# Patient Record
Sex: MALE | Race: BLACK OR AFRICAN AMERICAN | NOT HISPANIC OR LATINO | Employment: UNEMPLOYED | URBAN - METROPOLITAN AREA
[De-identification: names, ages, dates, MRNs, and addresses within clinical notes are randomized per-mention and may not be internally consistent; named-entity substitution may affect disease eponyms.]

---

## 2018-11-26 ENCOUNTER — HOSPITAL ENCOUNTER (INPATIENT)
Facility: HOSPITAL | Age: 53
LOS: 4 days | Discharge: HOME | DRG: 304 | End: 2018-11-30
Attending: EMERGENCY MEDICINE | Admitting: FAMILY MEDICINE
Payer: MEDICAID

## 2018-11-26 DIAGNOSIS — R06.01 ORTHOPNEA: ICD-10-CM

## 2018-11-26 DIAGNOSIS — I50.9 ACUTE CONGESTIVE HEART FAILURE, UNSPECIFIED HEART FAILURE TYPE: ICD-10-CM

## 2018-11-26 DIAGNOSIS — N17.9 ACUTE KIDNEY INJURY: Primary | ICD-10-CM

## 2018-11-26 DIAGNOSIS — R79.89 ELEVATED TROPONIN: ICD-10-CM

## 2018-11-26 DIAGNOSIS — I16.1 HYPERTENSIVE EMERGENCY: ICD-10-CM

## 2018-11-26 DIAGNOSIS — R06.02 SOB (SHORTNESS OF BREATH): ICD-10-CM

## 2018-11-26 LAB
ALBUMIN SERPL BCP-MCNC: 3.4 G/DL
ALP SERPL-CCNC: 75 U/L
ALT SERPL W/O P-5'-P-CCNC: 13 U/L
AMPHET+METHAMPHET UR QL: NEGATIVE
ANION GAP SERPL CALC-SCNC: 11 MMOL/L
ANION GAP SERPL CALC-SCNC: 8 MMOL/L
APTT BLDCRRT: 28 SEC
AST SERPL-CCNC: 18 U/L
BACTERIA #/AREA URNS HPF: NORMAL /HPF
BARBITURATES UR QL SCN>200 NG/ML: NEGATIVE
BASOPHILS # BLD AUTO: 0.05 K/UL
BASOPHILS NFR BLD: 0.6 %
BENZODIAZ UR QL SCN>200 NG/ML: NEGATIVE
BILIRUB SERPL-MCNC: 0.7 MG/DL
BILIRUB UR QL STRIP: NEGATIVE
BNP SERPL-MCNC: 1083 PG/ML
BNP SERPL-MCNC: 1083 PG/ML
BUN SERPL-MCNC: 46 MG/DL
BUN SERPL-MCNC: 50 MG/DL
BZE UR QL SCN: NEGATIVE
CALCIUM SERPL-MCNC: 9.1 MG/DL
CALCIUM SERPL-MCNC: 9.4 MG/DL
CANNABINOIDS UR QL SCN: NORMAL
CHLORIDE SERPL-SCNC: 103 MMOL/L
CHLORIDE SERPL-SCNC: 104 MMOL/L
CK SERPL-CCNC: 119 U/L
CLARITY UR: CLEAR
CO2 SERPL-SCNC: 24 MMOL/L
CO2 SERPL-SCNC: 28 MMOL/L
COLOR UR: YELLOW
CREAT SERPL-MCNC: 5.1 MG/DL
CREAT SERPL-MCNC: 5.1 MG/DL
CREAT UR-MCNC: 45.7 MG/DL
CREAT UR-MCNC: 45.7 MG/DL
CREAT UR-MCNC: 74 MG/DL
DIFFERENTIAL METHOD: ABNORMAL
EOSINOPHIL # BLD AUTO: 0.2 K/UL
EOSINOPHIL NFR BLD: 2.5 %
ERYTHROCYTE [DISTWIDTH] IN BLOOD BY AUTOMATED COUNT: 14.2 %
EST. GFR  (AFRICAN AMERICAN): 14 ML/MIN/1.73 M^2
EST. GFR  (AFRICAN AMERICAN): 14 ML/MIN/1.73 M^2
EST. GFR  (NON AFRICAN AMERICAN): 12 ML/MIN/1.73 M^2
EST. GFR  (NON AFRICAN AMERICAN): 12 ML/MIN/1.73 M^2
ESTIMATED AVG GLUCOSE: 100 MG/DL
ETHANOL UR-MCNC: <10 MG/DL
GLUCOSE SERPL-MCNC: 110 MG/DL
GLUCOSE SERPL-MCNC: 91 MG/DL
GLUCOSE UR QL STRIP: NEGATIVE
HBA1C MFR BLD HPLC: 5.1 %
HCT VFR BLD AUTO: 38 %
HGB BLD-MCNC: 12.8 G/DL
HGB UR QL STRIP: ABNORMAL
HYALINE CASTS #/AREA URNS LPF: 0 /LPF
INR PPP: 1
KETONES UR QL STRIP: NEGATIVE
LEUKOCYTE ESTERASE UR QL STRIP: NEGATIVE
LYMPHOCYTES # BLD AUTO: 1.8 K/UL
LYMPHOCYTES NFR BLD: 22.3 %
MCH RBC QN AUTO: 27.9 PG
MCHC RBC AUTO-ENTMCNC: 33.7 G/DL
MCV RBC AUTO: 83 FL
METHADONE UR QL SCN>300 NG/ML: NEGATIVE
MICROSCOPIC COMMENT: NORMAL
MONOCYTES # BLD AUTO: 0.5 K/UL
MONOCYTES NFR BLD: 5.8 %
NEUTROPHILS # BLD AUTO: 5.6 K/UL
NEUTROPHILS NFR BLD: 68.7 %
NITRITE UR QL STRIP: NEGATIVE
OPIATES UR QL SCN: NEGATIVE
PCP UR QL SCN>25 NG/ML: NEGATIVE
PH UR STRIP: 7 [PH] (ref 5–8)
PLATELET # BLD AUTO: 191 K/UL
PMV BLD AUTO: 10.2 FL
POCT GLUCOSE: 92 MG/DL (ref 70–110)
POTASSIUM SERPL-SCNC: 3.8 MMOL/L
POTASSIUM SERPL-SCNC: 4.3 MMOL/L
PROT SERPL-MCNC: 6.9 G/DL
PROT UR QL STRIP: ABNORMAL
PROT UR-MCNC: 43 MG/DL
PROT/CREAT UR: 0.94 MG/G{CREAT}
PROTHROMBIN TIME: 10.1 SEC
RBC # BLD AUTO: 4.59 M/UL
RBC #/AREA URNS HPF: 1 /HPF (ref 0–4)
SODIUM SERPL-SCNC: 139 MMOL/L
SODIUM SERPL-SCNC: 139 MMOL/L
SODIUM UR-SCNC: 94 MMOL/L
SP GR UR STRIP: 1.01 (ref 1–1.03)
TOXICOLOGY INFORMATION: NORMAL
TROPONIN I SERPL DL<=0.01 NG/ML-MCNC: 0.32 NG/ML
TROPONIN I SERPL DL<=0.01 NG/ML-MCNC: 0.34 NG/ML
TROPONIN I SERPL DL<=0.01 NG/ML-MCNC: 0.37 NG/ML
TSH SERPL DL<=0.005 MIU/L-ACNC: 1.43 UIU/ML
URN SPEC COLLECT METH UR: ABNORMAL
UROBILINOGEN UR STRIP-ACNC: NEGATIVE EU/DL
UUN UR-MCNC: 295 MG/DL
WBC # BLD AUTO: 8.11 K/UL
WBC #/AREA URNS HPF: 1 /HPF (ref 0–5)
YEAST URNS QL MICRO: NORMAL

## 2018-11-26 PROCEDURE — 25000003 PHARM REV CODE 250: Performed by: EMERGENCY MEDICINE

## 2018-11-26 PROCEDURE — 84484 ASSAY OF TROPONIN QUANT: CPT | Mod: 91

## 2018-11-26 PROCEDURE — 81000 URINALYSIS NONAUTO W/SCOPE: CPT

## 2018-11-26 PROCEDURE — 85730 THROMBOPLASTIN TIME PARTIAL: CPT

## 2018-11-26 PROCEDURE — 85610 PROTHROMBIN TIME: CPT

## 2018-11-26 PROCEDURE — 25000003 PHARM REV CODE 250: Performed by: STUDENT IN AN ORGANIZED HEALTH CARE EDUCATION/TRAINING PROGRAM

## 2018-11-26 PROCEDURE — 96374 THER/PROPH/DIAG INJ IV PUSH: CPT

## 2018-11-26 PROCEDURE — 80048 BASIC METABOLIC PNL TOTAL CA: CPT

## 2018-11-26 PROCEDURE — 80053 COMPREHEN METABOLIC PANEL: CPT

## 2018-11-26 PROCEDURE — 80307 DRUG TEST PRSMV CHEM ANLYZR: CPT

## 2018-11-26 PROCEDURE — 99291 CRITICAL CARE FIRST HOUR: CPT | Mod: 25

## 2018-11-26 PROCEDURE — 83880 ASSAY OF NATRIURETIC PEPTIDE: CPT

## 2018-11-26 PROCEDURE — 96375 TX/PRO/DX INJ NEW DRUG ADDON: CPT

## 2018-11-26 PROCEDURE — 93005 ELECTROCARDIOGRAM TRACING: CPT

## 2018-11-26 PROCEDURE — 84300 ASSAY OF URINE SODIUM: CPT

## 2018-11-26 PROCEDURE — 84156 ASSAY OF PROTEIN URINE: CPT

## 2018-11-26 PROCEDURE — 85025 COMPLETE CBC W/AUTO DIFF WBC: CPT

## 2018-11-26 PROCEDURE — 94761 N-INVAS EAR/PLS OXIMETRY MLT: CPT

## 2018-11-26 PROCEDURE — 84540 ASSAY OF URINE/UREA-N: CPT

## 2018-11-26 PROCEDURE — 36415 COLL VENOUS BLD VENIPUNCTURE: CPT

## 2018-11-26 PROCEDURE — 83036 HEMOGLOBIN GLYCOSYLATED A1C: CPT

## 2018-11-26 PROCEDURE — 20000000 HC ICU ROOM

## 2018-11-26 PROCEDURE — 82550 ASSAY OF CK (CPK): CPT

## 2018-11-26 PROCEDURE — 87205 SMEAR GRAM STAIN: CPT

## 2018-11-26 PROCEDURE — 63600175 PHARM REV CODE 636 W HCPCS: Performed by: EMERGENCY MEDICINE

## 2018-11-26 PROCEDURE — 84484 ASSAY OF TROPONIN QUANT: CPT

## 2018-11-26 PROCEDURE — 84443 ASSAY THYROID STIM HORMONE: CPT

## 2018-11-26 PROCEDURE — 25000003 PHARM REV CODE 250: Performed by: FAMILY MEDICINE

## 2018-11-26 RX ORDER — LABETALOL HCL 20 MG/4 ML
20 SYRINGE (ML) INTRAVENOUS
Status: COMPLETED | OUTPATIENT
Start: 2018-11-26 | End: 2018-11-26

## 2018-11-26 RX ORDER — IBUPROFEN 200 MG
16 TABLET ORAL
Status: DISCONTINUED | OUTPATIENT
Start: 2018-11-26 | End: 2018-11-26

## 2018-11-26 RX ORDER — INSULIN ASPART 100 [IU]/ML
1-10 INJECTION, SOLUTION INTRAVENOUS; SUBCUTANEOUS
Status: DISCONTINUED | OUTPATIENT
Start: 2018-11-26 | End: 2018-11-26

## 2018-11-26 RX ORDER — GLUCAGON 1 MG
1 KIT INJECTION
Status: DISCONTINUED | OUTPATIENT
Start: 2018-11-26 | End: 2018-11-26

## 2018-11-26 RX ORDER — NICARDIPINE HYDROCHLORIDE 0.2 MG/ML
2.5 INJECTION INTRAVENOUS CONTINUOUS
Status: DISCONTINUED | OUTPATIENT
Start: 2018-11-26 | End: 2018-11-26

## 2018-11-26 RX ORDER — HYDRALAZINE HYDROCHLORIDE 20 MG/ML
10 INJECTION INTRAMUSCULAR; INTRAVENOUS
Status: COMPLETED | OUTPATIENT
Start: 2018-11-26 | End: 2018-11-26

## 2018-11-26 RX ORDER — FUROSEMIDE 10 MG/ML
60 INJECTION INTRAMUSCULAR; INTRAVENOUS
Status: COMPLETED | OUTPATIENT
Start: 2018-11-26 | End: 2018-11-26

## 2018-11-26 RX ORDER — NICARDIPINE HYDROCHLORIDE 0.2 MG/ML
5 INJECTION INTRAVENOUS CONTINUOUS
Status: DISCONTINUED | OUTPATIENT
Start: 2018-11-26 | End: 2018-11-26

## 2018-11-26 RX ORDER — ONDANSETRON 8 MG/1
8 TABLET, ORALLY DISINTEGRATING ORAL EVERY 8 HOURS PRN
Status: DISCONTINUED | OUTPATIENT
Start: 2018-11-26 | End: 2018-11-30 | Stop reason: HOSPADM

## 2018-11-26 RX ORDER — IBUPROFEN 200 MG
24 TABLET ORAL
Status: DISCONTINUED | OUTPATIENT
Start: 2018-11-26 | End: 2018-11-26

## 2018-11-26 RX ORDER — NIFEDIPINE 30 MG/1
30 TABLET, EXTENDED RELEASE ORAL DAILY
Status: DISCONTINUED | OUTPATIENT
Start: 2018-11-26 | End: 2018-11-27

## 2018-11-26 RX ORDER — SODIUM CHLORIDE 0.9 % (FLUSH) 0.9 %
5 SYRINGE (ML) INJECTION
Status: DISCONTINUED | OUTPATIENT
Start: 2018-11-26 | End: 2018-11-30 | Stop reason: HOSPADM

## 2018-11-26 RX ORDER — HYDRALAZINE HYDROCHLORIDE 20 MG/ML
10 INJECTION INTRAMUSCULAR; INTRAVENOUS EVERY 6 HOURS PRN
Status: DISCONTINUED | OUTPATIENT
Start: 2018-11-26 | End: 2018-11-27

## 2018-11-26 RX ADMIN — NICARDIPINE HYDROCHLORIDE 5 MG/HR: 0.2 INJECTION, SOLUTION INTRAVENOUS at 02:11

## 2018-11-26 RX ADMIN — LABETALOL HYDROCHLORIDE 20 MG: 5 INJECTION, SOLUTION INTRAVENOUS at 11:11

## 2018-11-26 RX ADMIN — HYDRALAZINE HYDROCHLORIDE 10 MG: 20 INJECTION INTRAMUSCULAR; INTRAVENOUS at 12:11

## 2018-11-26 RX ADMIN — FUROSEMIDE 60 MG: 10 INJECTION, SOLUTION INTRAMUSCULAR; INTRAVENOUS at 12:11

## 2018-11-26 RX ADMIN — NIFEDIPINE 30 MG: 30 TABLET, FILM COATED, EXTENDED RELEASE ORAL at 09:11

## 2018-11-26 NOTE — H&P
[facility]   History & Physical    Patient Name: Bre Reynolds  MRN: [id number]  Admission Date: 11/26/2018  Attending Physician: Lewis Gaffney III, MD   Primary Care Provider: Primary Doctor No    Subjective:     Chief Complaint/Reason for Admission: SOB    History of Present Illness:  Patient is a 53 y.o. male presents to the ED complaining of SOB x 2 weeks. Patient states that he now sleeps with 3 pillows at night. Patient becomes SOB when he lays flat. Patient now only able to climb one flight of stairs before becoming short of breath. Patient was aware of his diagnosis of HTN, however failed to take his medication due to non-compliance.     Review of patient's allergies indicates:  No Known Allergies    Past Medical History:   Diagnosis Date    Heart murmur     Hypertension      History reviewed. No pertinent surgical history.  Family History     None        Tobacco Use    Smoking status: Current Every Day Smoker     Packs/day: 0.50     Types: Cigarettes   Substance and Sexual Activity    Alcohol use: No     Frequency: Never    Drug use: Not on file    Sexual activity: Not on file     Review of Systems   Constitutional: Positive for activity change. Negative for chills, fatigue and fever.   HENT: Negative for congestion and trouble swallowing.    Eyes: Negative for photophobia.   Respiratory: Positive for chest tightness, shortness of breath and wheezing. Negative for apnea and cough.    Cardiovascular: Positive for palpitations. Negative for chest pain and leg swelling.   Gastrointestinal: Negative for abdominal distention, abdominal pain, blood in stool, constipation, nausea and vomiting.   Endocrine: Negative for cold intolerance and polyuria.   Genitourinary: Negative for decreased urine volume, difficulty urinating, frequency and urgency.   Musculoskeletal: Negative for arthralgias, back pain and gait problem.   Skin: Negative for color change and pallor.    Allergic/Immunologic: Negative for immunocompromised state.   Neurological: Negative for dizziness and headaches.   Hematological: Negative for adenopathy.   Psychiatric/Behavioral: Negative for agitation.     Objective:     Vital Signs (Most Recent):  Temp: 97.4 °F (36.3 °C) (11/26/18 1038)  Pulse: 93 (11/26/18 1342)  Resp: (!) 32 (11/26/18 1342)  BP: (!) 170/116 (11/26/18 1342)  SpO2: 100 % (11/26/18 1342) Vital Signs (24h Range):  Temp:  [97.4 °F (36.3 °C)] 97.4 °F (36.3 °C)  Pulse:  [] 93  Resp:  [16-32] 32  SpO2:  [96 %-100 %] 100 %  BP: (170-216)/(116-149) 170/116     Weight: 107.7 kg (237 lb 6.1 oz)  Body mass index is 32.19 kg/m².    Physical Exam   Constitutional: He is oriented to person, place, and time. He appears well-developed and well-nourished.   HENT:   Head: Normocephalic and atraumatic.   Eyes: Conjunctivae and EOM are normal. Pupils are equal, round, and reactive to light.   Neck: Normal range of motion. Neck supple.   Cardiovascular: Normal rate. Exam reveals gallop.   s4 noted on exam    Pulmonary/Chest: He has wheezes. He exhibits no tenderness.   Poor respiratory effort. Decreased breath sounds b/l. Expiratory wheezes noted in the upper lobe b/l    Abdominal: Soft. Bowel sounds are normal.   Musculoskeletal: Normal range of motion. He exhibits no edema.   Neurological: He is alert and oriented to person, place, and time.   Skin: Skin is warm and dry. Capillary refill takes less than 2 seconds.   Psychiatric: He has a normal mood and affect.       Significant Labs:  Recent Labs   Lab 11/26/18  1112   WBC 8.11   RBC 4.59*   HGB 12.8*   HCT 38.0*      MCV 83   MCH 27.9   MCHC 33.7       Recent Labs   Lab 11/26/18  1112   CALCIUM 9.4   PROT 6.9      K 4.3   CO2 28      BUN 46*   CREATININE 5.1*   ALKPHOS 75   ALT 13   AST 18   BILITOT 0.7     Significant Diagnostics:  Imaging Results          X-Ray Chest PA And Lateral (Final result)     Abnormal  Result time 11/26/18  11:20:28    Final result by Arnol Santana MD (11/26/18 11:20:28)                 Impression:      No radiographic evidence of acute cardiopulmonary process.    1 cm nodular opacity projecting over the mid aspect of the left midlung zone.  Consider further evaluation with nonemergent CT chest.    This report was flagged in Epic as abnormal.      Electronically signed by: Arnol Santana  Date:    11/26/2018  Time:    11:20             Narrative:    EXAMINATION:  XR CHEST PA AND LATERAL    CLINICAL HISTORY:  Shortness of breath    TECHNIQUE:  PA and lateral views of the chest were performed.    COMPARISON:  None    FINDINGS:  The lungs are symmetrically expanded.  Mediastinal structures are midline.  The cardiac silhouette is within normal limits.  No pneumothorax, consolidation, or pleural effusion is seen.  A 1 cm nodular opacity projects over the left midlung zone.  No acute osseous abnormality is identified.                                  Assessment/Plan:   52 y/o male presents to the ED complaining of SOB in Hypertensive Emergency       Hypertensive Emergency   /135 on presentation   Nephropathy likely 2/2  To HTN with Elevated Bun/Cr  Patient given Lasix 60mg IV, Hydralazine 10mg IV and Labetalol 20mg IV one time.   Tox screen pending  Patient's BP decreased by 10-20 percent in the first hour   Will lower patient's BP 5-15 percent over the next 23 hours per tx guidelines  Will be begin cardene drip and admit patient to ICU   Inpatient consult to [facility] cardiology placed     Acute Heart Failure   Patient with new onset heart failure   BNP elevated to 1,083   Patient with decreased Breath sounds B/l  Elevated Troponin .315  Will trend Troponins  EKG pending     Chronic Kidney Injury   -Intrarenal disease likely 2/2 HTN  -BUN:CR ratio < 10:1  -GFR 14  -Nephrology consult placed   -Monitor UOP      Active Diagnoses:    Diagnosis Date Noted POA    Hypertensive emergency [I16.1] 11/26/2018 Yes      Problems  Resolved During this Admission:     VTE Risk Mitigation (From admission, onward)        Ordered     Place sequential compression device  Until discontinued      11/26/18 1344     IP VTE HIGH RISK PATIENT  Once      11/26/18 1344          Jarod Tee MD  Family Medicine   [facility]

## 2018-11-26 NOTE — ED PROVIDER NOTES
Encounter Date: 11/26/2018       History     Chief Complaint   Patient presents with    Shortness of Breath     c/o difficulty taking a deep breath for the past 2 weeks. States SOB worsens when lying flat, and has been needing to sleep sitting up. Denies pain. Reports mild chest congestion and nonproductive cough     HPI  Review of patient's allergies indicates:  No Known Allergies  Past Medical History:   Diagnosis Date    Heart murmur     Hypertension      History reviewed. No pertinent surgical history.  Family History   Problem Relation Age of Onset    Blindness Neg Hx     Glaucoma Neg Hx     Retinal detachment Neg Hx     Macular degeneration Neg Hx     Strabismus Neg Hx      Social History     Tobacco Use    Smoking status: Current Every Day Smoker     Packs/day: 0.50     Types: Cigarettes   Substance Use Topics    Alcohol use: No     Frequency: Never    Drug use: Not on file     Review of Systems    Physical Exam     Initial Vitals [11/26/18 1038]   BP Pulse Resp Temp SpO2   (!) 212/135 96 20 97.4 °F (36.3 °C) 97 %      MAP       --         Physical Exam    ED Course   Procedures  Labs Reviewed   B-TYPE NATRIURETIC PEPTIDE   CBC W/ AUTO DIFFERENTIAL   COMPREHENSIVE METABOLIC PANEL   TROPONIN I   B-TYPE NATRIURETIC PEPTIDE     EKG Readings: (Independently Interpreted)   Initial Reading: No STEMI (1045). Rhythm: Normal Sinus Rhythm. Heart Rate: 92. T Waves Flipped: I, AVL, V4, V5 and V6. Other Impression: LVH       Imaging Results          X-Ray Chest PA And Lateral (In process)                                    ED Course as of Nov 26 1102   Mon Nov 26, 2018   1033 Triage Sort Note: Bre Reynolds, a nontoxic/well appearing, 53 y.o. male, presented to the ED with c/o 2 weeks of not being able to take a deep breath, chest tightness and SOB. Denies cough. States he may have congestion. He is back and forth about whether he had a cough. Pt having to sit up while sleeping or he feels like he can't breath.      Patient seen and medically screened by Nurse Practitioner in triage. Orders initiated at triage to expedite care. Care will be transferred to an alternate provider when patient was placed in an Exam Room from the [facility] for physical exam, additional orders, and disposition.  10:36 AM Ophelia Villanueva DNP, MARCELO-BC      [AT]      ED Course User Index  [AT] MARCELO Webber     Clinical Impression:   {Add your Clinical Impression here. If you haven't documented one yet, please pend the note, finalize a Clinical Impression, and refresh your note before signing.:[id number]}

## 2018-11-26 NOTE — PLAN OF CARE
Problem: Patient Care Overview  Goal: Plan of Care Review  Outcome: Ongoing (interventions implemented as appropriate)  Plan of care reviewed with patient, questions answered by ICU RN. Cardene titrated as ordered to lower blood pressure

## 2018-11-26 NOTE — PROGRESS NOTES
Pt arrived to 266; ambulated to bed from stretcher and was placed on ICU monitors. Pt oriented to new room, siderails raised x 3, call light placed in reach. Orders reviewed from chart and implemented. See doc flowsheets for details. Will continue to monitor

## 2018-11-26 NOTE — ED PROVIDER NOTES
Encounter Date: 11/26/2018    SCRIBE #1 NOTE: I, Candice Eldridge, am scribing for, and in the presence of,  Dr. Collins. I have scribed the entire note.       History     Chief Complaint   Patient presents with    Shortness of Breath     c/o difficulty taking a deep breath for the past 2 weeks. States SOB worsens when lying flat, and has been needing to sleep sitting up. Denies pain. Reports mild chest congestion and nonproductive cough     Bre Reynolds is a 53 y.o. male who  has a past medical history of Heart murmur and Hypertension.    The patient presents to the ED due to shortness of breath for about one or two weeks. The pain is described as an off and on tightness in his chest. Location of the pain is the left side of his chest and he has experianced this pain for about one week. The patient claims he use to take blood pressure medication but has not taken it in two years. He uses tobacco but denies any illicit drug or EtOH use. The patient also doesn't go to the doctor.           The history is provided by the patient.     Review of patient's allergies indicates:  No Known Allergies  Past Medical History:   Diagnosis Date    Heart murmur     Hypertension      History reviewed. No pertinent surgical history.  Family History   Problem Relation Age of Onset    Blindness Neg Hx     Glaucoma Neg Hx     Retinal detachment Neg Hx     Macular degeneration Neg Hx     Strabismus Neg Hx      Social History     Tobacco Use    Smoking status: Current Every Day Smoker     Packs/day: 0.50     Types: Cigarettes   Substance Use Topics    Alcohol use: No     Frequency: Never    Drug use: Not on file     Review of Systems   Constitutional: Negative for chills and fever.   HENT: Negative for congestion, ear pain, rhinorrhea and sore throat.    Respiratory: Positive for chest tightness and shortness of breath. Negative for cough and wheezing.    Cardiovascular: Positive for chest pain. Negative for palpitations.    Gastrointestinal: Negative for abdominal pain, diarrhea, nausea and vomiting.   Genitourinary: Negative for dysuria and hematuria.   Musculoskeletal: Negative for back pain, myalgias and neck pain.   Skin: Negative for rash.   Neurological: Negative for dizziness, weakness, light-headedness and headaches.   Psychiatric/Behavioral: Negative for confusion.       Physical Exam     Initial Vitals [11/26/18 1038]   BP Pulse Resp Temp SpO2   (!) 212/135 96 20 97.4 °F (36.3 °C) 97 %      MAP       --         Physical Exam    Nursing note and vitals reviewed.  Constitutional: He appears well-developed and well-nourished. He is not diaphoretic. No distress.   HENT:   Head: Normocephalic and atraumatic.   Mouth/Throat: Oropharynx is clear and moist.   Neck: Normal range of motion. Neck supple.   Cardiovascular: Normal rate, regular rhythm and normal heart sounds. Exam reveals no gallop and no friction rub.    No murmur heard.  No JVD   Pulmonary/Chest: Breath sounds normal. He has no wheezes. He has no rhonchi. He has no rales.   Lungs clear   Abdominal: Soft. There is no tenderness. There is no rebound and no guarding.   Musculoskeletal: Normal range of motion. He exhibits no edema or tenderness.   Lymphadenopathy:     He has no cervical adenopathy.   Neurological: He is alert and oriented to person, place, and time. He has normal strength.   Skin: Skin is warm and dry. No rash noted.   Psychiatric: He has a normal mood and affect. His behavior is normal. Judgment and thought content normal.         ED Course   Critical Care  Date/Time: 11/26/2018 1:53 PM  Performed by: Darleen Collins MD  Authorized by: Lewis Gaffney III, MD   Total critical care time (exclusive of procedural time) : 40 minutes  Critical care time was exclusive of separately billable procedures and treating other patients.  Critical care was necessary to treat or prevent imminent or life-threatening deterioration of the following conditions: renal failure  and cardiac failure.  Critical care was time spent personally by me on the following activities: blood draw for specimens, discussions with consultants, evaluation of patient's response to treatment, obtaining history from patient or surrogate, ordering and review of laboratory studies, pulse oximetry, development of treatment plan with patient or surrogate, examination of patient, ordering and performing treatments and interventions, ordering and review of radiographic studies and re-evaluation of patient's condition.        Labs Reviewed   B-TYPE NATRIURETIC PEPTIDE - Abnormal; Notable for the following components:       Result Value    BNP 1,083 (*)     All other components within normal limits   CBC W/ AUTO DIFFERENTIAL - Abnormal; Notable for the following components:    RBC 4.59 (*)     Hemoglobin 12.8 (*)     Hematocrit 38.0 (*)     All other components within normal limits   COMPREHENSIVE METABOLIC PANEL - Abnormal; Notable for the following components:    BUN, Bld 46 (*)     Creatinine 5.1 (*)     Albumin 3.4 (*)     eGFR if  14 (*)     eGFR if non  12 (*)     All other components within normal limits   TROPONIN I - Abnormal; Notable for the following components:    Troponin I 0.315 (*)     All other components within normal limits   B-TYPE NATRIURETIC PEPTIDE - Abnormal; Notable for the following components:    BNP 1,083 (*)     All other components within normal limits   TOXICOLOGY SCREEN, URINE, RANDOM (COMPLIANCE)     EKG Readings: (Independently Interpreted)   Initial: 1045. Heart Rate: 92. T Waves Flipped: AVL, II, V4, V5 and V6. Other Impression: LVH       Imaging Results          X-Ray Chest PA And Lateral (Final result)     Abnormal  Result time 11/26/18 11:20:28    Final result by Arnol Santana MD (11/26/18 11:20:28)                 Impression:      No radiographic evidence of acute cardiopulmonary process.    1 cm nodular opacity projecting over the mid aspect  of the left midlung zone.  Consider further evaluation with nonemergent CT chest.    This report was flagged in Epic as abnormal.      Electronically signed by: Arnol Santana  Date:    11/26/2018  Time:    11:20             Narrative:    EXAMINATION:  XR CHEST PA AND LATERAL    CLINICAL HISTORY:  Shortness of breath    TECHNIQUE:  PA and lateral views of the chest were performed.    COMPARISON:  None    FINDINGS:  The lungs are symmetrically expanded.  Mediastinal structures are midline.  The cardiac silhouette is within normal limits.  No pneumothorax, consolidation, or pleural effusion is seen.  A 1 cm nodular opacity projects over the left midlung zone.  No acute osseous abnormality is identified.                                 Medical Decision Making:   Clinical Tests:   Lab Tests: Ordered and Reviewed  Radiological Study: Ordered and Reviewed  Medical Tests: Ordered and Reviewed  ED Management:  The patient came to the emergency department with shortness of breath and some chest tightness.  The patient has been unable to lay down for the past week.  The patient's blood pressure is 216/140.  When reviewing old charts, the patient was in the Emergency Department in 2013 and 2014 with blood pressures in the similar ranges.  The patient has not been on blood pressure medication for years.  Today, his creatinine is 5.1, he has a nodule in his left mid lung, elevated troponin and elevated BNP.  Labetalol was given which did not lower his blood pressure, patient now being given hydralazine and Lasix.  He will be admitted to the [facility]"" Family Medicine service for further workup of the new onset renal failure and blood pressure control.  The patient was also strongly encouraged to quit smoking.                   ED Course as of Nov 26 1225   Mon Nov 26, 2018   1033 Triage Sort Note: Bre Reynolds, a nontoxic/well appearing, 53 y.o. male, presented to the ED with c/o 2 weeks of not being able to take a deep breath, chest  tightness and SOB. Denies cough. States he may have congestion. He is back and forth about whether he had a cough. Pt having to sit up while sleeping or he feels like he can't breath.     Patient seen and medically screened by Nurse Practitioner in triage. Orders initiated at triage to expedite care. Care will be transferred to an alternate provider when patient was placed in an Exam Room from the [facility] for physical exam, additional orders, and disposition.  10:36 AM Ophelia Villanueva DNP, FNP-BC      [AT]   [address] [facility] [employer] Medicine consulted for admission  [ST]      ED Course User Index  [AT] MARCELO Webber  [ST] Darleen Collins MD     Clinical Impression:     1. Acute kidney injury    2. SOB (shortness of breath)    3. Acute congestive heart failure, unspecified heart failure type    4. Elevated troponin    5. Orthopnea               I, Darleen Collins, personally performed the services described in this documentation. All medical record entries made by the scribe were at my direction and in my presence.  I have reviewed the chart and agree that the record reflects my personal performance and is accurate and complete. Darleen Collins M.D. 12:25 PM11/26/2018                    Darleen Collins MD  11/26/18 [id number]       Darleen Collins MD  11/26/18 [id number]

## 2018-11-26 NOTE — PLAN OF CARE
Patient AAOx3  Independent with ADL's  Drove himself to hospital  Lives with parents  Hasn't seen a MD in a few years  No established PCP       11/26/18 1435   Discharge Assessment   Assessment Type Discharge Planning Assessment   Confirmed/corrected address and phone number on facesheet? Yes   Assessment information obtained from? Patient   Prior to hospitilization cognitive status: Alert/Oriented   Prior to hospitalization functional status: Independent   Current cognitive status: Alert/Oriented   Current Functional Status: Independent   Lives With parent(s)   Able to Return to Prior Arrangements yes   Is patient able to care for self after discharge? Yes   Patient's perception of discharge disposition home or selfcare   Readmission Within The Last 30 Days no previous admission in last 30 days   Patient currently being followed by outpatient case management? No   Patient currently receives any other outside agency services? No   Is the patient taking medications as prescribed? no   Does the patient have transportation home? Yes   Transportation Available car   Discharge Plan A Home   Discharge Plan B Home with family   Patient/Family In Agreement With Plan yes     Madhuri Robert, RN, CCM, CMSRN  RN Transition Navigator  [phone]

## 2018-11-27 PROBLEM — I10 HYPERTENSION: Chronic | Status: ACTIVE | Noted: 2018-11-27

## 2018-11-27 PROBLEM — I50.20 SYSTOLIC HEART FAILURE: Status: ACTIVE | Noted: 2018-11-27

## 2018-11-27 LAB
25(OH)D3+25(OH)D2 SERPL-MCNC: 17 NG/ML
ALBUMIN SERPL BCP-MCNC: 3.2 G/DL
ALP SERPL-CCNC: 67 U/L
ALT SERPL W/O P-5'-P-CCNC: 11 U/L
ANION GAP SERPL CALC-SCNC: 10 MMOL/L
ANION GAP SERPL CALC-SCNC: 11 MMOL/L
AORTIC ROOT ANNULUS: 3.47 CM
AORTIC VALVE CUSP SEPERATION: 2.36 CM
AST SERPL-CCNC: 16 U/L
AV INDEX (PROSTH): 0.73
AV MEAN GRADIENT: 3.75 MMHG
AV PEAK GRADIENT: 5.2 MMHG
BASOPHILS # BLD AUTO: 0.04 K/UL
BASOPHILS NFR BLD: 0.4 %
BILIRUB SERPL-MCNC: 0.7 MG/DL
BSA FOR ECHO PROCEDURE: 2.34 M2
BUN SERPL-MCNC: 48 MG/DL
BUN SERPL-MCNC: 49 MG/DL
CALCIUM SERPL-MCNC: 8.9 MG/DL
CALCIUM SERPL-MCNC: 9.4 MG/DL
CHLORIDE SERPL-SCNC: 102 MMOL/L
CHLORIDE SERPL-SCNC: 103 MMOL/L
CO2 SERPL-SCNC: 24 MMOL/L
CO2 SERPL-SCNC: 25 MMOL/L
CREAT SERPL-MCNC: 4.6 MG/DL
CREAT SERPL-MCNC: 4.8 MG/DL
CV ECHO LV RWT: 0.51 CM
DIFFERENTIAL METHOD: ABNORMAL
DOP CALC AO PEAK VEL: 1.14 M/S
DOP CALC AO VTI: 18.45 CM
DOP CALCLVOT PEAK VEL VTI: 13.54 CM
ECHO LV POSTERIOR WALL: 1.65 CM (ref 0.6–1.1)
EOSINOPHIL # BLD AUTO: 0.2 K/UL
EOSINOPHIL NFR BLD: 2.6 %
EOSINOPHIL URNS QL WRIGHT STN: NORMAL
ERYTHROCYTE [DISTWIDTH] IN BLOOD BY AUTOMATED COUNT: 14.2 %
EST. GFR  (AFRICAN AMERICAN): 15 ML/MIN/1.73 M^2
EST. GFR  (AFRICAN AMERICAN): 16 ML/MIN/1.73 M^2
EST. GFR  (NON AFRICAN AMERICAN): 13 ML/MIN/1.73 M^2
EST. GFR  (NON AFRICAN AMERICAN): 14 ML/MIN/1.73 M^2
FERRITIN SERPL-MCNC: 135 NG/ML
FRACTIONAL SHORTENING: 13 % (ref 28–44)
GLUCOSE SERPL-MCNC: 119 MG/DL
GLUCOSE SERPL-MCNC: 92 MG/DL
HCT VFR BLD AUTO: 35.6 %
HGB BLD-MCNC: 12.2 G/DL
INTERVENTRICULAR SEPTUM: 1.71 CM (ref 0.6–1.1)
IRON SERPL-MCNC: 54 UG/DL
LA MAJOR: 5.53 CM
LA MINOR: 6.07 CM
LA WIDTH: 4.18 CM
LEFT ATRIUM SIZE: 4.17 CM
LEFT ATRIUM VOLUME INDEX: 36.6 ML/M2
LEFT ATRIUM VOLUME: 85.75 CM3
LEFT INTERNAL DIMENSION IN SYSTOLE: 5.56 CM (ref 2.1–4)
LEFT VENTRICLE DIASTOLIC VOLUME INDEX: 89.53 ML/M2
LEFT VENTRICLE DIASTOLIC VOLUME: 209.49 ML
LEFT VENTRICLE MASS INDEX: 238.2 G/M2
LEFT VENTRICLE SYSTOLIC VOLUME INDEX: 64.5 ML/M2
LEFT VENTRICLE SYSTOLIC VOLUME: 150.95 ML
LEFT VENTRICULAR INTERNAL DIMENSION IN DIASTOLE: 6.41 CM (ref 3.5–6)
LEFT VENTRICULAR MASS: 557.37 G
LYMPHOCYTES # BLD AUTO: 2.2 K/UL
LYMPHOCYTES NFR BLD: 23.6 %
MAGNESIUM SERPL-MCNC: 2 MG/DL
MCH RBC QN AUTO: 28.1 PG
MCHC RBC AUTO-ENTMCNC: 34.3 G/DL
MCV RBC AUTO: 82 FL
MONOCYTES # BLD AUTO: 0.6 K/UL
MONOCYTES NFR BLD: 6.1 %
NEUTROPHILS # BLD AUTO: 6.2 K/UL
NEUTROPHILS NFR BLD: 67.1 %
PHOSPHATE SERPL-MCNC: 5.3 MG/DL
PISA TR MAX VEL: 2.45 M/S
PLATELET # BLD AUTO: 185 K/UL
PMV BLD AUTO: 10.5 FL
POCT GLUCOSE: 110 MG/DL (ref 70–110)
POTASSIUM SERPL-SCNC: 3.3 MMOL/L
POTASSIUM SERPL-SCNC: 3.6 MMOL/L
PROT SERPL-MCNC: 6.4 G/DL
PULM VEIN S/D RATIO: 0.55
PV PEAK D VEL: 0.58 M/S
PV PEAK S VEL: 0.32 M/S
PV PEAK VELOCITY: 0.71 CM/S
RA MAJOR: 4.25 CM
RA PRESSURE: 3 MMHG
RBC # BLD AUTO: 4.34 M/UL
RIGHT VENTRICULAR END-DIASTOLIC DIMENSION: 3.07 CM
SATURATED IRON: 21 %
SODIUM SERPL-SCNC: 137 MMOL/L
SODIUM SERPL-SCNC: 138 MMOL/L
TOTAL IRON BINDING CAPACITY: 263 UG/DL
TR MAX PG: 24.01 MMHG
TRANSFERRIN SERPL-MCNC: 178 MG/DL
TROPONIN I SERPL DL<=0.01 NG/ML-MCNC: 0.37 NG/ML
TROPONIN I SERPL DL<=0.01 NG/ML-MCNC: 0.46 NG/ML
TV REST PULMONARY ARTERY PRESSURE: 27.01 MMHG
URATE SERPL-MCNC: 6.9 MG/DL
WBC # BLD AUTO: 9.23 K/UL

## 2018-11-27 PROCEDURE — 25000003 PHARM REV CODE 250: Performed by: STUDENT IN AN ORGANIZED HEALTH CARE EDUCATION/TRAINING PROGRAM

## 2018-11-27 PROCEDURE — 84550 ASSAY OF BLOOD/URIC ACID: CPT

## 2018-11-27 PROCEDURE — 83540 ASSAY OF IRON: CPT

## 2018-11-27 PROCEDURE — 20000000 HC ICU ROOM

## 2018-11-27 PROCEDURE — 82728 ASSAY OF FERRITIN: CPT

## 2018-11-27 PROCEDURE — 36415 COLL VENOUS BLD VENIPUNCTURE: CPT

## 2018-11-27 PROCEDURE — 85025 COMPLETE CBC W/AUTO DIFF WBC: CPT

## 2018-11-27 PROCEDURE — 25000003 PHARM REV CODE 250: Performed by: FAMILY MEDICINE

## 2018-11-27 PROCEDURE — 84484 ASSAY OF TROPONIN QUANT: CPT

## 2018-11-27 PROCEDURE — 93005 ELECTROCARDIOGRAM TRACING: CPT

## 2018-11-27 PROCEDURE — 94761 N-INVAS EAR/PLS OXIMETRY MLT: CPT

## 2018-11-27 PROCEDURE — 80053 COMPREHEN METABOLIC PANEL: CPT

## 2018-11-27 PROCEDURE — 82306 VITAMIN D 25 HYDROXY: CPT

## 2018-11-27 PROCEDURE — 83735 ASSAY OF MAGNESIUM: CPT

## 2018-11-27 PROCEDURE — 63600175 PHARM REV CODE 636 W HCPCS: Performed by: STUDENT IN AN ORGANIZED HEALTH CARE EDUCATION/TRAINING PROGRAM

## 2018-11-27 PROCEDURE — 84484 ASSAY OF TROPONIN QUANT: CPT | Mod: 91

## 2018-11-27 PROCEDURE — 80048 BASIC METABOLIC PNL TOTAL CA: CPT

## 2018-11-27 PROCEDURE — 84100 ASSAY OF PHOSPHORUS: CPT

## 2018-11-27 PROCEDURE — 63600175 PHARM REV CODE 636 W HCPCS: Performed by: FAMILY MEDICINE

## 2018-11-27 PROCEDURE — 25000003 PHARM REV CODE 250: Performed by: INTERNAL MEDICINE

## 2018-11-27 RX ORDER — POTASSIUM CHLORIDE 20 MEQ/15ML
40 SOLUTION ORAL ONCE
Status: COMPLETED | OUTPATIENT
Start: 2018-11-27 | End: 2018-11-27

## 2018-11-27 RX ORDER — HYDRALAZINE HYDROCHLORIDE 20 MG/ML
10 INJECTION INTRAMUSCULAR; INTRAVENOUS EVERY 6 HOURS PRN
Status: COMPLETED | OUTPATIENT
Start: 2018-11-27 | End: 2018-11-27

## 2018-11-27 RX ORDER — HYDRALAZINE HYDROCHLORIDE 20 MG/ML
10 INJECTION INTRAMUSCULAR; INTRAVENOUS EVERY 6 HOURS PRN
Status: DISCONTINUED | OUTPATIENT
Start: 2018-11-27 | End: 2018-11-27

## 2018-11-27 RX ORDER — FAMOTIDINE 20 MG/1
20 TABLET, FILM COATED ORAL NIGHTLY
Status: DISCONTINUED | OUTPATIENT
Start: 2018-11-27 | End: 2018-11-30 | Stop reason: HOSPADM

## 2018-11-27 RX ORDER — FUROSEMIDE 40 MG/1
80 TABLET ORAL 2 TIMES DAILY
Status: DISCONTINUED | OUTPATIENT
Start: 2018-11-27 | End: 2018-11-30 | Stop reason: HOSPADM

## 2018-11-27 RX ORDER — CARVEDILOL 12.5 MG/1
12.5 TABLET ORAL 2 TIMES DAILY
Status: DISCONTINUED | OUTPATIENT
Start: 2018-11-27 | End: 2018-11-28

## 2018-11-27 RX ORDER — LABETALOL HCL 20 MG/4 ML
10 SYRINGE (ML) INTRAVENOUS EVERY 6 HOURS PRN
Status: DISCONTINUED | OUTPATIENT
Start: 2018-11-27 | End: 2018-11-29

## 2018-11-27 RX ORDER — HYDRALAZINE HYDROCHLORIDE 20 MG/ML
10 INJECTION INTRAMUSCULAR; INTRAVENOUS EVERY 8 HOURS PRN
Status: DISCONTINUED | OUTPATIENT
Start: 2018-11-27 | End: 2018-11-29

## 2018-11-27 RX ORDER — LABETALOL HCL 20 MG/4 ML
10 SYRINGE (ML) INTRAVENOUS EVERY 6 HOURS PRN
Status: DISCONTINUED | OUTPATIENT
Start: 2018-11-27 | End: 2018-11-27

## 2018-11-27 RX ORDER — SEVELAMER CARBONATE 800 MG/1
800 TABLET, FILM COATED ORAL 2 TIMES DAILY WITH MEALS
Status: DISCONTINUED | OUTPATIENT
Start: 2018-11-27 | End: 2018-11-30

## 2018-11-27 RX ORDER — SODIUM CHLORIDE 9 MG/ML
INJECTION, SOLUTION INTRAVENOUS ONCE
Status: DISCONTINUED | OUTPATIENT
Start: 2018-11-27 | End: 2018-11-27

## 2018-11-27 RX ORDER — SODIUM CHLORIDE 9 MG/ML
INJECTION, SOLUTION INTRAVENOUS
Status: DISCONTINUED | OUTPATIENT
Start: 2018-11-27 | End: 2018-11-30 | Stop reason: HOSPADM

## 2018-11-27 RX ORDER — NIFEDIPINE 30 MG/1
30 TABLET, EXTENDED RELEASE ORAL ONCE
Status: COMPLETED | OUTPATIENT
Start: 2018-11-27 | End: 2018-11-27

## 2018-11-27 RX ORDER — ACETAMINOPHEN 325 MG/1
650 TABLET ORAL EVERY 6 HOURS PRN
Status: DISCONTINUED | OUTPATIENT
Start: 2018-11-27 | End: 2018-11-30 | Stop reason: HOSPADM

## 2018-11-27 RX ORDER — NIFEDIPINE 60 MG/1
60 TABLET, EXTENDED RELEASE ORAL DAILY
Status: DISCONTINUED | OUTPATIENT
Start: 2018-11-28 | End: 2018-11-29

## 2018-11-27 RX ADMIN — CARVEDILOL 12.5 MG: 12.5 TABLET, FILM COATED ORAL at 11:11

## 2018-11-27 RX ADMIN — LABETALOL HYDROCHLORIDE 10 MG: 5 INJECTION, SOLUTION INTRAVENOUS at 11:11

## 2018-11-27 RX ADMIN — LABETALOL HYDROCHLORIDE 10 MG: 5 INJECTION, SOLUTION INTRAVENOUS at 06:11

## 2018-11-27 RX ADMIN — HYDRALAZINE HYDROCHLORIDE 10 MG: 20 INJECTION INTRAMUSCULAR; INTRAVENOUS at 04:11

## 2018-11-27 RX ADMIN — ACETAMINOPHEN 650 MG: 325 TABLET ORAL at 05:11

## 2018-11-27 RX ADMIN — CARVEDILOL 12.5 MG: 12.5 TABLET, FILM COATED ORAL at 08:11

## 2018-11-27 RX ADMIN — HYDRALAZINE HYDROCHLORIDE 10 MG: 20 INJECTION INTRAMUSCULAR; INTRAVENOUS at 05:11

## 2018-11-27 RX ADMIN — FUROSEMIDE 80 MG: 40 TABLET ORAL at 11:11

## 2018-11-27 RX ADMIN — HYDRALAZINE HYDROCHLORIDE 10 MG: 20 INJECTION INTRAMUSCULAR; INTRAVENOUS at 10:11

## 2018-11-27 RX ADMIN — SEVELAMER CARBONATE 800 MG: 800 TABLET, FILM COATED ORAL at 05:11

## 2018-11-27 RX ADMIN — SEVELAMER CARBONATE 800 MG: 800 TABLET, FILM COATED ORAL at 06:11

## 2018-11-27 RX ADMIN — FUROSEMIDE 80 MG: 40 TABLET ORAL at 05:11

## 2018-11-27 RX ADMIN — NIFEDIPINE 30 MG: 30 TABLET, FILM COATED, EXTENDED RELEASE ORAL at 08:11

## 2018-11-27 RX ADMIN — LABETALOL HYDROCHLORIDE 10 MG: 5 INJECTION, SOLUTION INTRAVENOUS at 08:11

## 2018-11-27 RX ADMIN — HYDRALAZINE HYDROCHLORIDE 10 MG: 20 INJECTION INTRAMUSCULAR; INTRAVENOUS at 03:11

## 2018-11-27 RX ADMIN — FAMOTIDINE 20 MG: 20 TABLET ORAL at 08:11

## 2018-11-27 RX ADMIN — POTASSIUM CHLORIDE 40 MEQ: 20 SOLUTION ORAL at 06:11

## 2018-11-27 NOTE — PROGRESS NOTES
Progress Note  [facility]  Admit Date: 11/26/2018   LOS: 1 day   SUBJECTIVE:   Follow-up For: SOB    Patient seen and examined this AM. Patient tolerated Cardene drip yesterday, now being transitioned to PO medication. Patient's BP this Am was 170/110's. Nifedipine 30mg q24 started yesterday following d/c of cardene drip Will begin titration pf PO medications. Patient is tolerating PO diet, ambulating and passing flatus. No new complaints.       ROS  Constitutional: Positive for activity change. Negative for chills, fatigue and fever.   HENT: Negative for congestion and trouble swallowing.    Eyes: Negative for photophobia.   Respiratory: Positive for chest tightness, shortness of breath and wheezing. Negative for apnea and cough.    Cardiovascular: Positive for palpitations. Negative for chest pain and leg swelling.   Gastrointestinal: Negative for abdominal distention, abdominal pain, blood in stool, constipation, nausea and vomiting.   Endocrine: Negative for cold intolerance and polyuria.   Genitourinary: Negative for decreased urine volume, difficulty urinating, frequency and urgency.   Musculoskeletal: Negative for arthralgias, back pain and gait problem.   Skin: Negative for color change and pallor.   Allergic/Immunologic: Negative for immunocompromised state.   Neurological: Negative for dizziness and headaches.   Hematological: Negative for adenopathy.   Psychiatric/Behavioral: Negative for agitation      OBJECTIVE:   Vital Signs (Most Recent)  Temp: 98.2 °F (36.8 °C) (11/27/18 0310)  Pulse: 91 (11/27/18 0530)  Resp: 16 (11/27/18 0530)  BP: (!) 170/108(MD aware- PRN Hydralazine to be administered ) (11/27/18 0530)  SpO2: 100 % (11/27/18 0530)    I & O (Last 24H):    Intake/Output Summary (Last 24 hours) at 11/27/2018 0604  Last data filed at 11/27/2018 0500  Gross per 24 hour   Intake 565 ml   Output 3760 ml   Net -3195 ml     Wt Readings from Last 3 Encounters:   11/27/18 104.4 kg (230 lb 2.6 oz)    12/26/13 108.9 kg (240 lb)       Current Diet Order   Procedures    Diet Cardiac        Physical Exam  Constitutional: He is oriented to person, place, and time. He appears well-developed and well-nourished.   HENT:   Head: Normocephalic and atraumatic.   Eyes: Conjunctivae and EOM are normal. Pupils are equal, round, and reactive to light.   Neck: Normal range of motion. Neck supple.   Cardiovascular: Normal rate. Exam reveals gallop.   s4 noted on exam    Pulmonary/Chest: He has wheezes. He exhibits no tenderness.   Poor respiratory effort. Decreased breath sounds b/l. Expiratory wheezes noted in the upper lobe b/l    Abdominal: Soft. Bowel sounds are normal.   Musculoskeletal: Normal range of motion. He exhibits no edema.   Neurological: He is alert and oriented to person, place, and time.   Skin: Skin is warm and dry. Capillary refill takes less than 2 seconds.   Psychiatric: He has a normal mood and affect.     Laboratory Data:  CBC  Recent Labs   Lab 11/26/18  1112 11/27/18  0337   WBC 8.11 9.23   RBC 4.59* 4.34*   HGB 12.8* 12.2*   HCT 38.0* 35.6*    185   MCV 83 82   MCH 27.9 28.1   MCHC 33.7 34.3     CMP  Recent Labs   Lab 11/26/18  1112 11/26/18 2001 11/27/18  0337   CALCIUM 9.4 9.1 8.9   PROT 6.9  --  6.4    139 138   K 4.3 3.8 3.3*   CO2 28 24 25    104 102   BUN 46* 50* 49*   CREATININE 5.1* 5.1* 4.8*   ALKPHOS 75  --  67   ALT 13  --  11   AST 18  --  16   BILITOT 0.7  --  0.7     POCT-Glucose  POCT Glucose   Date Value Ref Range Status   11/26/2018 92 70 - 110 mg/dL Final     COAGS  Recent Labs   Lab 11/26/18  1406   INR 1.0   APTT 28.0     UA  Recent Labs   Lab 11/26/18  1703   COLORU Yellow   SPECGRAV 1.010   PHUR 7.0   PROTEINUA 1+*   BACTERIA Rare     MICRO  Microbiology Results (last 7 days)     ** No results found for the last 168 hours. **        Diagnostic Results:  Imaging Results          X-Ray Chest PA And Lateral (Final result)     Abnormal  Result time 11/26/18  11:20:28    Final result by Arnol Santana MD (11/26/18 11:20:28)                 Impression:      No radiographic evidence of acute cardiopulmonary process.    1 cm nodular opacity projecting over the mid aspect of the left midlung zone.  Consider further evaluation with nonemergent CT chest.    This report was flagged in Epic as abnormal.      Electronically signed by: Arnol Santana  Date:    11/26/2018  Time:    11:20             Narrative:    EXAMINATION:  XR CHEST PA AND LATERAL    CLINICAL HISTORY:  Shortness of breath    TECHNIQUE:  PA and lateral views of the chest were performed.    COMPARISON:  None    FINDINGS:  The lungs are symmetrically expanded.  Mediastinal structures are midline.  The cardiac silhouette is within normal limits.  No pneumothorax, consolidation, or pleural effusion is seen.  A 1 cm nodular opacity projects over the left midlung zone.  No acute osseous abnormality is identified.                                ASSESSMENT/PLAN:   Bre Reynolds is a 53 y.o. male    Neuro  GCS 15  A&Ox4  Pain control as tolerated    CVS  HTN emergency on presentation   Patient started on cardene drip now d/c  Currently titrating BP meds  Currently on nifedipine 30mg q24hr  Labetalol and hydralazine PRN with parameters  Patient with new onset heart failure   Troponin now trending up .457. Will repeat Tropin and EKG  Follow up repeat CXR this AM   F/u inpatient cardiology recs     Pulmonary   Patient currently sat 100% on room air   Breathing improved since admission  No acute intervention at this time     GI/FEN  Will determine need for fluids this AM  Hypokalemia - 3.3 this AM will replenish. 40meq given PO    Phos elevated - phos binder started    Renal   Urine NA and Cr WNL  Pr:Cr ratio elevated at 43   Likely acute on chronic heart failure  Possible CKD 4/5. GFR 15   Avoid nephrotoxic agents     Heme/ID  H/H 12.2/25.6   WBC wnl   Patient remains afebrile    Msk/Integ   No intervention at this  time    Code: full  PPx: SCDs and Famotidine 20mg   Disp: Pending clinical improvement. Titration of BP meds and recs from card and nephro.       Jarod Tee MD  [facility], PGY-1

## 2018-11-27 NOTE — PLAN OF CARE
54 y/o male presents to the ED complaining of SOB in Hypertensive Emergency           LOPEZ / possible underline CKD   - ua + protein negative for blood   - not see a nephrology before   - cr 5.0 gfr 14   - hgb 12,8  hypertensive Emergency   Acute heart failure     Plant     Not acute HD need   Good urine output  Urine Na,Cr, urea   P/c ratio   Decrease 25 % of BP map. Int he nex 8 hrs then decrease slowly

## 2018-11-27 NOTE — PROGRESS NOTES
TN visited with pt and updated on plan of care; plan for stepdown to floor when bed available. Tn to continue to follow.    Pt does not have PCP; Tn scheduled pt in [facility]

## 2018-11-27 NOTE — PROGRESS NOTES
MD notified of elevated BP readings after PRN medications given. New orders noted and carried out. Will continue to monitor.

## 2018-11-27 NOTE — PROGRESS NOTES
MD updated on current vitals. All PRN meds given. Verbal order received. Will continue to monitor.

## 2018-11-27 NOTE — NURSING
Pt BP remains elevated 169/107 after administration of PRN Hydralazine 10mg IV PRN q6hr. @ 0330. HR 80-90's, SR, Denies CP or SOB, c/p slight headache, no change in vision. No n/v. Total uop for shift now 2L. Dr. Mixon notified. New orders placed.

## 2018-11-27 NOTE — PLAN OF CARE
Problem: Patient Care Overview  Goal: Plan of Care Review  Outcome: Ongoing (interventions implemented as appropriate)  Pt AAOx4, afebrile, off of Cardene gtt since 6pm yesterday, -150's/100, Procardia PO started, PRN Hydrazine for SBP >170/DBP >100 administered x1. SpO2 100% RA. Denies CP or SOB. Troponin up @ 0.457, Cards consult today. Voids per urinal- 1300ml uop this shift. BUN 49, Cr 4.8, K 3.3, Phos 5.3. Renal US today. Neph consulted. Denies n/v. Cardiac diet in place. PIV x2- c/d/i saline locked. Safety precautions in place.     Problem: Fall Risk (Adult)  Goal: Identify Related Risk Factors and Signs and Symptoms  Related risk factors and signs and symptoms are identified upon initiation of Human Response Clinical Practice Guideline (CPG)  Outcome: Ongoing (interventions implemented as appropriate)  Remained in bed throughout shift. Turns/repositions self. Safety precautions in place. Free from fall/injury     Problem: Hypertensive Disease/Crisis (Arterial) (Adult)  Goal: Signs and Symptoms of Listed Potential Problems Will be Absent, Minimized or Managed (Hypertensive Disease/Crisis)  Signs and symptoms of listed potential problems will be absent, minimized or managed by discharge/transition of care (reference Hypertensive Disease/Crisis (Arterial) (Adult) CPG).  Outcome: Ongoing (interventions implemented as appropriate)  Off of cardene gtt prior to start of shift. Procardia 30mg PO started, PRN Hydralazine 10mg IV for SBP >170 and DBP >100 administered per MD orders. Troponin trending up (0.457). No CP or SOB. Cards consult.     Problem: Renal Failure/Kidney Injury, Acute (Adult)  Goal: Signs and Symptoms of Listed Potential Problems Will be Absent, Minimized or Managed (Renal Failure/Kidney Injury, Acute)  Signs and symptoms of listed potential problems will be absent, minimized or managed by discharge/transition of care (reference Renal Failure/Kidney Injury, Acute (Adult) CPG).  Outcome:  Ongoing (interventions implemented as appropriate)  Cr. 4.8, BUN 49, K 3.3, Phos 5.3, voids per urinal- total uop 1,350ml  for shift. Planning for renal US today.

## 2018-11-27 NOTE — CONSULTS
[facility] consults Note    CC:     Rafael/ckd     Subjective:     No complains today    OFF OF cardene drip     ?  Bre  has a past medical history of Heart murmur and Hypertension.  Bre  has no past surgical history on file.  His family history is not on file.  Bre  reports that he has been smoking cigarettes.  He has been smoking about 0.50 packs per day. He does not have any smokeless tobacco history on file. He reports that he does not drink alcohol. His drug history is not on file.  No current facility-administered medications on file prior to encounter.      No current outpatient medications on file prior to encounter.     Bre has No Known Allergies.  ?      Objective:   BP (!) 156/101   Pulse 98   Temp 98.2 °F (36.8 °C) (Oral)   Resp (!) 26   Ht 6' (1.829 m)   Wt 104.4 kg (230 lb 2.6 oz)   SpO2 100%   BMI 31.22 kg/m²   Gen - NAD, A+Ox4, not confused  Gait - normal  HEENT/ NECK - EOMI/, NCAT, no JVD   CVS - RRR, no m/r/s3/s4  Resp - CTAB, no w/r/r  Abd - soft, NT, ND   Ext - no c/c/e  Psych - normal affect and behavior  Neuro - no asterixis  Laboratory:  Recent Results (from the past 24 hour(s))   Brain natriuretic peptide    Collection Time: 11/26/18 11:12 AM   Result Value Ref Range    BNP 1,083 (H) 0 - 99 pg/mL   CBC auto differential    Collection Time: 11/26/18 11:12 AM   Result Value Ref Range    WBC 8.11 3.90 - 12.70 K/uL    RBC 4.59 (L) 4.60 - 6.20 M/uL    Hemoglobin 12.8 (L) 14.0 - 18.0 g/dL    Hematocrit 38.0 (L) 40.0 - 54.0 %    MCV 83 82 - 98 fL    MCH 27.9 27.0 - 31.0 pg    MCHC 33.7 32.0 - 36.0 g/dL    RDW 14.2 11.5 - 14.5 %    Platelets 191 150 - 350 K/uL    MPV 10.2 9.2 - 12.9 fL    Gran # (ANC) 5.6 1.8 - 7.7 K/uL    Lymph # 1.8 1.0 - 4.8 K/uL    Mono # 0.5 0.3 - 1.0 K/uL    Eos # 0.2 0.0 - 0.5 K/uL    Baso # 0.05 0.00 - 0.20 K/uL    Gran% 68.7 38.0 - 73.0 %    Lymph% 22.3 18.0 - 48.0 %    Mono% 5.8 4.0 - 15.0 %    Eosinophil% 2.5 0.0 - 8.0 %    Basophil% 0.6 0.0 - 1.9 %     Differential Method Automated    Comprehensive metabolic panel    Collection Time: 11/26/18 11:12 AM   Result Value Ref Range    Sodium 139 136 - 145 mmol/L    Potassium 4.3 3.5 - 5.1 mmol/L    Chloride 103 95 - 110 mmol/L    CO2 28 23 - 29 mmol/L    Glucose 110 70 - 110 mg/dL    BUN, Bld 46 (H) 6 - 20 mg/dL    Creatinine 5.1 (H) 0.5 - 1.4 mg/dL    Calcium 9.4 8.7 - 10.5 mg/dL    Total Protein 6.9 6.0 - 8.4 g/dL    Albumin 3.4 (L) 3.5 - 5.2 g/dL    Total Bilirubin 0.7 0.1 - 1.0 mg/dL    Alkaline Phosphatase 75 55 - 135 U/L    AST 18 10 - 40 U/L    ALT 13 10 - 44 U/L    Anion Gap 8 8 - 16 mmol/L    eGFR if African American 14 (A) >60 mL/min/1.73 m^2    eGFR if non African American 12 (A) >60 mL/min/1.73 m^2   Troponin I    Collection Time: 11/26/18 11:12 AM   Result Value Ref Range    Troponin I 0.315 (H) 0.000 - 0.026 ng/mL   Brain natriuretic peptide    Collection Time: 11/26/18 11:12 AM   Result Value Ref Range    BNP 1,083 (H) 0 - 99 pg/mL   Toxicology screen, urine    Collection Time: 11/26/18 12:45 PM   Result Value Ref Range    Alcohol, Urine <10 <10 mg/dL    Benzodiazepines Negative     Methadone metabolites Negative     Cocaine (Metab.) Negative     Opiate Scrn, Ur Negative     Barbiturate Screen, Ur Negative     Amphetamine Screen, Ur Negative     THC Presumptive Positive     Phencyclidine Negative     Creatinine, Random Ur 74.0 23.0 - 375.0 mg/dL    Toxicology Information SEE COMMENT    Hemoglobin A1c    Collection Time: 11/26/18  2:06 PM   Result Value Ref Range    Hemoglobin A1C 5.1 4.0 - 5.6 %    Estimated Avg Glucose 100 68 - 131 mg/dL   Troponin I    Collection Time: 11/26/18  2:06 PM   Result Value Ref Range    Troponin I 0.342 (H) 0.000 - 0.026 ng/mL   APTT    Collection Time: 11/26/18  2:06 PM   Result Value Ref Range    aPTT 28.0 21.0 - 32.0 sec   TSH    Collection Time: 11/26/18  2:06 PM   Result Value Ref Range    TSH 1.426 0.400 - 4.000 uIU/mL   Protime-INR    Collection Time: 11/26/18  2:06 PM    Result Value Ref Range    Prothrombin Time 10.1 9.0 - 12.5 sec    INR 1.0 0.8 - 1.2   Transthoracic echo (TTE) complete (Cupid Only)    Collection Time: 11/26/18  2:06 PM   Result Value Ref Range    AV mean gradient 3.75 mmHg    Ao peak keaton 1.14 m/s    Ao VTI 18.45 cm    IVS 1.71 (A) 0.6 - 1.1 cm    LA size 4.17 cm    Left Atrium Major Axis 5.53 cm    Left Atrium Minor Axis 6.07 cm    LVIDD 6.41 (A) 3.5 - 6.0 cm    LVIDS 5.56 (A) 2.1 - 4.0 cm    LVOT peak VTI 13.54 cm    PW 1.65 (A) 0.6 - 1.1 cm    PV Peak D Keaton 0.58 m/s    PV Peak S Keaton 0.32 m/s    Right Atrium Major [place] 4.25 cm    RVDD 3.07 cm    TR Max Keaton 2.45 m/s    LA WIDTH 4.18 cm    Ao root annulus 3.47 cm    AORTIC VALVE CUSP SEPERATION 2.36 cm    PV PEAK VELOCITY 0.71 cm/s    LV Diastolic Volume 209.49 mL    LV Systolic Volume 150.95 mL    FS 13 %    LA volume 85.75 cm3    LV mass 557.37 g    Left Ventricle Relative Wall Thickness 0.51 cm    AV index (prosthetic) 0.73     Pulm vein S/D ratio 0.55     AV peak gradient 5.20 mmHg    Triscuspid Valve Regurgitation Peak Gradient 24.01 mmHg    BSA 2.34 m2    LV Systolic Volume Index 64.5 mL/m2    LV Diastolic Volume Index 89.53 mL/m2    LA Volume Index 36.6 mL/m2    LV Mass Index 238.2 g/m2    Right Atrial Pressure (from IVC) 3 mmHg    TV rest pulmonary artery pressure 27.01 mmHg   POCT glucose    Collection Time: 11/26/18  4:03 PM   Result Value Ref Range    POCT Glucose 92 70 - 110 mg/dL   Urinalysis    Collection Time: 11/26/18  5:03 PM   Result Value Ref Range    Specimen UA Urine, Clean Catch     Color, UA Yellow Yellow, Straw, Kelly    Appearance, UA Clear Clear    pH, UA 7.0 5.0 - 8.0    Specific Gravity, UA 1.010 1.005 - 1.030    Protein, UA 1+ (A) Negative    Glucose, UA Negative Negative    Ketones, UA Negative Negative    Bilirubin (UA) Negative Negative    Occult Blood UA Trace (A) Negative    Nitrite, UA Negative Negative    Urobilinogen, UA Negative <2.0 EU/dL    Leukocytes, UA Negative Negative    Urinalysis Microscopic    Collection Time: 11/26/18  5:03 PM   Result Value Ref Range    RBC, UA 1 0 - 4 /hpf    WBC, UA 1 0 - 5 /hpf    Bacteria, UA Rare None-Occ /hpf    Yeast, UA None None    Hyaline Casts, UA 0 0-1/lpf /lpf    Microscopic Comment SEE COMMENT    Troponin I    Collection Time: 11/26/18  8:01 PM   Result Value Ref Range    Troponin I 0.369 (H) 0.000 - 0.026 ng/mL   Basic metabolic panel    Collection Time: 11/26/18  8:01 PM   Result Value Ref Range    Sodium 139 136 - 145 mmol/L    Potassium 3.8 3.5 - 5.1 mmol/L    Chloride 104 95 - 110 mmol/L    CO2 24 23 - 29 mmol/L    Glucose 91 70 - 110 mg/dL    BUN, Bld 50 (H) 6 - 20 mg/dL    Creatinine 5.1 (H) 0.5 - 1.4 mg/dL    Calcium 9.1 8.7 - 10.5 mg/dL    Anion Gap 11 8 - 16 mmol/L    eGFR if African American 14 (A) >60 mL/min/1.73 m^2    eGFR if non African American 12 (A) >60 mL/min/1.73 m^2   CK    Collection Time: 11/26/18  8:01 PM   Result Value Ref Range     20 - 200 U/L   Protein / creatinine ratio, urine    Collection Time: 11/26/18 11:10 PM   Result Value Ref Range    Protein, Urine Random 43 (H) 0 - 15 mg/dL    Creatinine, Random Ur 45.7 23.0 - 375.0 mg/dL    Prot/Creat Ratio, Ur 0.94 (H) 0.00 - 0.20   Sodium, urine, random    Collection Time: 11/26/18 11:10 PM   Result Value Ref Range    Sodium Urine Random 94 20 - 250 mmol/L   Creatinine, urine, random    Collection Time: 11/26/18 11:10 PM   Result Value Ref Range    Creatinine, Random Ur 45.7 23.0 - 375.0 mg/dL   Urea nitrogen, urine    Collection Time: 11/26/18 11:10 PM   Result Value Ref Range    Urine Urea Nitrogen, Random 295 140 - 1050 mg/dL   Shore's Stain, Urine Random    Collection Time: 11/26/18 11:10 PM   Result Value Ref Range    Shore's Stain, Ur No eosinophils seen No eosinophils seen   Troponin I    Collection Time: 11/27/18  2:03 AM   Result Value Ref Range    Troponin I 0.457 (H) 0.000 - 0.026 ng/mL   Comprehensive Metabolic Panel (CMP)    Collection Time: 11/27/18   3:37 AM   Result Value Ref Range    Sodium 138 136 - 145 mmol/L    Potassium 3.3 (L) 3.5 - 5.1 mmol/L    Chloride 102 95 - 110 mmol/L    CO2 25 23 - 29 mmol/L    Glucose 92 70 - 110 mg/dL    BUN, Bld 49 (H) 6 - 20 mg/dL    Creatinine 4.8 (H) 0.5 - 1.4 mg/dL    Calcium 8.9 8.7 - 10.5 mg/dL    Total Protein 6.4 6.0 - 8.4 g/dL    Albumin 3.2 (L) 3.5 - 5.2 g/dL    Total Bilirubin 0.7 0.1 - 1.0 mg/dL    Alkaline Phosphatase 67 55 - 135 U/L    AST 16 10 - 40 U/L    ALT 11 10 - 44 U/L    Anion Gap 11 8 - 16 mmol/L    eGFR if African American 15 (A) >60 mL/min/1.73 m^2    eGFR if non African American 13 (A) >60 mL/min/1.73 m^2   Magnesium    Collection Time: 11/27/18  3:37 AM   Result Value Ref Range    Magnesium 2.0 1.6 - 2.6 mg/dL   Phosphorus    Collection Time: 11/27/18  3:37 AM   Result Value Ref Range    Phosphorus 5.3 (H) 2.7 - 4.5 mg/dL   CBC with Automated Differential    Collection Time: 11/27/18  3:37 AM   Result Value Ref Range    WBC 9.23 3.90 - 12.70 K/uL    RBC 4.34 (L) 4.60 - 6.20 M/uL    Hemoglobin 12.2 (L) 14.0 - 18.0 g/dL    Hematocrit 35.6 (L) 40.0 - 54.0 %    MCV 82 82 - 98 fL    MCH 28.1 27.0 - 31.0 pg    MCHC 34.3 32.0 - 36.0 g/dL    RDW 14.2 11.5 - 14.5 %    Platelets 185 150 - 350 K/uL    MPV 10.5 9.2 - 12.9 fL    Gran # (ANC) 6.2 1.8 - 7.7 K/uL    Lymph # 2.2 1.0 - 4.8 K/uL    Mono # 0.6 0.3 - 1.0 K/uL    Eos # 0.2 0.0 - 0.5 K/uL    Baso # 0.04 0.00 - 0.20 K/uL    Gran% 67.1 38.0 - 73.0 %    Lymph% 23.6 18.0 - 48.0 %    Mono% 6.1 4.0 - 15.0 %    Eosinophil% 2.6 0.0 - 8.0 %    Basophil% 0.4 0.0 - 1.9 %    Differential Method Automated      Recent Labs   Lab 11/26/18  1112 11/27/18  0337   WBC 8.11 9.23   HGB 12.8* 12.2*   HCT 38.0* 35.6*    185   MCV 83 82     Recent Labs   Lab 11/26/18  1112 11/26/18 2001 11/27/18  0337    139 138   K 4.3 3.8 3.3*    104 102   CO2 28 24 25   BUN 46* 50* 49*    91 92   CALCIUM 9.4 9.1 8.9   MG  --   --  2.0   PHOS  --   --  5.3*      Recent Labs   Lab 11/26/18  1112 11/27/18  0337   PROT 6.9 6.4   ALBUMIN 3.4* 3.2*   BILITOT 0.7 0.7   AST 18 16   ALT 13 11   ALKPHOS 75 67     Recent Labs   Lab 11/26/18  1406   INR 1.0     Cardiac:   Recent Labs   Lab 11/26/18  1112 11/26/18  1406 11/26/18  2001 11/27/18  0203   TROPONINI 0.315* 0.342* 0.369* 0.457*   BNP 1,083*  1,083*  --   --   --      FLP: No results found for: CHOL, HDL, LDLCALC, TRIG, CHOLHDL  DM:   Lab Results   Component Value Date    HGBA1C 5.1 11/26/2018    CREATININE 4.8 (H) 11/27/2018     Thyroid:   Lab Results   Component Value Date    TSH 1.426 11/26/2018     Anemia: No results found for: IRON, TIBC, FERRITIN, IOVIDLHY82, FOLATE  Urinalysis:   Lab Results   Component Value Date    COLORU Yellow 11/26/2018    SPECGRAV 1.010 11/26/2018    NITRITE Negative 11/26/2018    KETONESU Negative 11/26/2018    UROBILINOGEN Negative 11/26/2018     Assessment:   Creatinine is little better today good urine output. I explain different dialysis modalities to the patient possible a good candidate for PD. Not indications for acute hd at this point. Denies chest pain and sob      LOPEZ / possible underline CKD   - ua + protein negative for blood   - not see a nephrology before   - cr 5.0 gfr 14   - hgb 12,8  - p/c 0.94   - Renal US Both kidneys are hyperechoic.  Both kidneys are small in size, both measuring 9 cm in length.  No hydronephrosis.Prostate is enlarged and irregular in contour.   - FEUr 65.8 consistent with ATN possible 2/2 HTN EMERGENCY    FENA 7.5   hypertensive Emergency   Acute heart failure   MBD   - ON SEVELAMER 800 TID       Plan:     Follow up in nephrology clinic at discharge 1-2 weeks with dr collazo.   Lasix 80 mg po bid   Not hd at this moment   [place] mapping   CONSIDER Flomax, PSA  For Prostate is enlarged   Consider increase coreg if the bp continuing high   follow up pth, vit d, uric acid , iron panel   Basil Mckinney  [facility] PGY5    ?  ?

## 2018-11-27 NOTE — CONSULTS
Cardiology    Consult Requested By:   Reason for Consult: hypertension    SUBJECTIVE:     History of Present Illness:  Patient is a 53 y.o. male presents with history of hypertension with poor medical care; no history of diabetes, smoker; very active with no symptoms until about 1 week ago when he states he noted shortness of breath mostly while lying down. He did not notice much change with his physical activity but he might be minimizing his symptoms. He denies any chest pains. He has not been followed by any physicians and does not know what his blood pressures have been in the past. .       Review of patient's allergies indicates:  No Known Allergies    Past Medical History:   Diagnosis Date    Heart murmur     Hypertension      History reviewed. No pertinent surgical history.  Family History   Problem Relation Age of Onset    Blindness Neg Hx     Glaucoma Neg Hx     Retinal detachment Neg Hx     Macular degeneration Neg Hx     Strabismus Neg Hx      Social History     Tobacco Use    Smoking status: Current Every Day Smoker     Packs/day: 0.50     Types: Cigarettes   Substance Use Topics    Alcohol use: No     Frequency: Never    Drug use: Not on file        Home meds:  No current facility-administered medications on file prior to encounter.      No current outpatient medications on file prior to encounter.       Current meds:  Scheduled Meds:   famotidine  20 mg Oral QHS    [START ON 11/28/2018] NIFEdipine  60 mg Oral Daily    sevelamer carbonate  800 mg Oral BID WM     Continuous Infusions:  PRN Meds:.acetaminophen, dextrose 50%, hydrALAZINE, labetalol, ondansetron, pneumoc 13-elinor conj-dip cr(PF), sodium chloride 0.9%      OBJECTIVE:     Vital Signs (Most Recent)  Temp: 98.2 °F (36.8 °C) (11/27/18 0715)  Pulse: 99 (11/27/18 0830)  Resp: (!) 25 (11/27/18 0830)  BP: (!) 168/109 (11/27/18 0830)  SpO2: 100 % (11/27/18 0830)    Vital Signs Range (Last 24H):  Temp:  [97.4 °F (36.3 °C)-98.2 °F  (36.8 °C)]   Pulse:  []   Resp:  [14-46]   BP: (135-216)/()   SpO2:  [96 %-100 %]     Physical Exam:  Neck: normal carotid upstrokes; no bruits  Lungs: few crackles left base  Heart: RR, normal S1,S2, positive S3, no murmurs noted  Abd: negative  Exts: pulses noted DP and PT and strong, no edema     Laboratory:  LABS  CBC  Recent Labs   Lab 11/26/18 1112 11/27/18  0337   WBC 8.11 9.23   RBC 4.59* 4.34*   HGB 12.8* 12.2*   HCT 38.0* 35.6*    185   MCV 83 82   MCH 27.9 28.1   MCHC 33.7 34.3     BMP  Recent Labs   Lab 11/26/18  1112 11/26/18 2001 11/27/18  0337    139 138   K 4.3 3.8 3.3*   CO2 28 24 25    104 102   BUN 46* 50* 49*   CREATININE 5.1* 5.1* 4.8*    91 92       Recent Labs   Lab 11/26/18  1112 11/26/18 2001 11/27/18  0337   CALCIUM 9.4 9.1 8.9   MG  --   --  2.0   PHOS  --   --  5.3*       LFT  Recent Labs   Lab 11/26/18 1112 11/27/18  0337   PROT 6.9 6.4   ALBUMIN 3.4* 3.2*   BILITOT 0.7 0.7   AST 18 16   ALKPHOS 75 67   ALT 13 11       COAGS  Recent Labs   Lab 11/26/18  1406   INR 1.0   APTT 28.0     CE  Recent Labs   Lab 11/26/18 1406 11/26/18 2001 11/27/18  0203   TROPONINI 0.342* 0.369* 0.457*     BNP  Recent Labs   Lab 11/26/18 1112   BNP 1,083*  1,083*     Lipid panel:  No results found for: CHOL  No results found for: HDL  No results found for: LDLCALC  No results found for: TRIG  No results found for: CHOLHDL  Diagnostic Results:  EKG: sinus tachycardia; LVH with nonspecific T wave changes   CXR:nodule in lung;   Echo: EF 25% with LAE, LVE;  diastolic dysfunction; possible noncompaction     Chart review:  None available  ASSESSMENT/PLAN:     1. Uncontrolled hypertension with renal and cardiac dysfunction - cardiomyopathy with noncompaction? Most likely from uncontrolled hypertension.   2. Minimal troponin rise most likely due to demand ischemia    Plan: 1. Treat heart failure with beta blockers; hydralazine and nitrate combination; hold on ace till  evaluated by renal.   2. At this time, would not pursue any further cardiac workup to include an ischemic workup till he is treated  Lexie Barcenas MD

## 2018-11-28 LAB
ALBUMIN SERPL BCP-MCNC: 3.7 G/DL
ALP SERPL-CCNC: 80 U/L
ALT SERPL W/O P-5'-P-CCNC: 12 U/L
ANION GAP SERPL CALC-SCNC: 11 MMOL/L
AST SERPL-CCNC: 16 U/L
BASOPHILS # BLD AUTO: 0.02 K/UL
BASOPHILS NFR BLD: 0.2 %
BILIRUB SERPL-MCNC: 0.8 MG/DL
BUN SERPL-MCNC: 47 MG/DL
CALCIUM SERPL-MCNC: 9.8 MG/DL
CHLORIDE SERPL-SCNC: 101 MMOL/L
CO2 SERPL-SCNC: 24 MMOL/L
CREAT SERPL-MCNC: 4.5 MG/DL
DIFFERENTIAL METHOD: ABNORMAL
EOSINOPHIL # BLD AUTO: 0.2 K/UL
EOSINOPHIL NFR BLD: 1.6 %
ERYTHROCYTE [DISTWIDTH] IN BLOOD BY AUTOMATED COUNT: 14.4 %
EST. GFR  (AFRICAN AMERICAN): 16 ML/MIN/1.73 M^2
EST. GFR  (NON AFRICAN AMERICAN): 14 ML/MIN/1.73 M^2
GLUCOSE SERPL-MCNC: 110 MG/DL
HCT VFR BLD AUTO: 40.6 %
HGB BLD-MCNC: 13.9 G/DL
LYMPHOCYTES # BLD AUTO: 1.6 K/UL
LYMPHOCYTES NFR BLD: 14.4 %
MAGNESIUM SERPL-MCNC: 2.4 MG/DL
MCH RBC QN AUTO: 28.1 PG
MCHC RBC AUTO-ENTMCNC: 34.2 G/DL
MCV RBC AUTO: 82 FL
MONOCYTES # BLD AUTO: 0.7 K/UL
MONOCYTES NFR BLD: 6.5 %
NEUTROPHILS # BLD AUTO: 8.5 K/UL
NEUTROPHILS NFR BLD: 77.1 %
PHOSPHATE SERPL-MCNC: 4.7 MG/DL
PLATELET # BLD AUTO: 219 K/UL
PMV BLD AUTO: 10.1 FL
POTASSIUM SERPL-SCNC: 3.8 MMOL/L
PROT SERPL-MCNC: 7.5 G/DL
RBC # BLD AUTO: 4.95 M/UL
SODIUM SERPL-SCNC: 136 MMOL/L
WBC # BLD AUTO: 11.05 K/UL

## 2018-11-28 PROCEDURE — 25000003 PHARM REV CODE 250: Performed by: FAMILY MEDICINE

## 2018-11-28 PROCEDURE — 25000003 PHARM REV CODE 250: Performed by: INTERNAL MEDICINE

## 2018-11-28 PROCEDURE — 85025 COMPLETE CBC W/AUTO DIFF WBC: CPT

## 2018-11-28 PROCEDURE — 80053 COMPREHEN METABOLIC PANEL: CPT

## 2018-11-28 PROCEDURE — 84100 ASSAY OF PHOSPHORUS: CPT

## 2018-11-28 PROCEDURE — 25000003 PHARM REV CODE 250: Performed by: STUDENT IN AN ORGANIZED HEALTH CARE EDUCATION/TRAINING PROGRAM

## 2018-11-28 PROCEDURE — 83735 ASSAY OF MAGNESIUM: CPT

## 2018-11-28 PROCEDURE — 36415 COLL VENOUS BLD VENIPUNCTURE: CPT

## 2018-11-28 PROCEDURE — 63600175 PHARM REV CODE 636 W HCPCS: Performed by: FAMILY MEDICINE

## 2018-11-28 PROCEDURE — 11000001 HC ACUTE MED/SURG PRIVATE ROOM

## 2018-11-28 RX ORDER — SPIRONOLACTONE 25 MG/1
25 TABLET ORAL DAILY
Status: DISCONTINUED | OUTPATIENT
Start: 2018-11-28 | End: 2018-11-30 | Stop reason: HOSPADM

## 2018-11-28 RX ORDER — CARVEDILOL 25 MG/1
25 TABLET ORAL 2 TIMES DAILY
Status: DISCONTINUED | OUTPATIENT
Start: 2018-11-28 | End: 2018-11-29

## 2018-11-28 RX ORDER — TAMSULOSIN HYDROCHLORIDE 0.4 MG/1
0.4 CAPSULE ORAL DAILY
Status: DISCONTINUED | OUTPATIENT
Start: 2018-11-28 | End: 2018-11-30 | Stop reason: HOSPADM

## 2018-11-28 RX ORDER — ISOSORBIDE DINITRATE 10 MG/1
20 TABLET ORAL 4 TIMES DAILY
Status: DISCONTINUED | OUTPATIENT
Start: 2018-11-28 | End: 2018-11-28

## 2018-11-28 RX ADMIN — FAMOTIDINE 20 MG: 20 TABLET ORAL at 08:11

## 2018-11-28 RX ADMIN — TAMSULOSIN HYDROCHLORIDE 0.4 MG: 0.4 CAPSULE ORAL at 08:11

## 2018-11-28 RX ADMIN — FUROSEMIDE 80 MG: 40 TABLET ORAL at 08:11

## 2018-11-28 RX ADMIN — SEVELAMER CARBONATE 800 MG: 800 TABLET, FILM COATED ORAL at 05:11

## 2018-11-28 RX ADMIN — CARVEDILOL 25 MG: 25 TABLET, FILM COATED ORAL at 01:11

## 2018-11-28 RX ADMIN — ISOSORBIDE DINITRATE 20 MG: 10 TABLET ORAL at 08:11

## 2018-11-28 RX ADMIN — HYDRALAZINE HYDROCHLORIDE 10 MG: 20 INJECTION INTRAMUSCULAR; INTRAVENOUS at 12:11

## 2018-11-28 RX ADMIN — CARVEDILOL 12.5 MG: 12.5 TABLET, FILM COATED ORAL at 08:11

## 2018-11-28 RX ADMIN — LABETALOL HYDROCHLORIDE 10 MG: 5 INJECTION, SOLUTION INTRAVENOUS at 03:11

## 2018-11-28 RX ADMIN — ISOSORBIDE DINITRATE 20 MG: 10 TABLET ORAL at 04:11

## 2018-11-28 RX ADMIN — CARVEDILOL 25 MG: 25 TABLET, FILM COATED ORAL at 08:11

## 2018-11-28 RX ADMIN — SEVELAMER CARBONATE 800 MG: 800 TABLET, FILM COATED ORAL at 08:11

## 2018-11-28 RX ADMIN — NIFEDIPINE 60 MG: 30 TABLET, FILM COATED, EXTENDED RELEASE ORAL at 08:11

## 2018-11-28 RX ADMIN — FUROSEMIDE 80 MG: 40 TABLET ORAL at 05:11

## 2018-11-28 RX ADMIN — SPIRONOLACTONE 25 MG: 25 TABLET ORAL at 05:11

## 2018-11-28 NOTE — PROGRESS NOTES
[facility] Progress  Note    CC:     Rafael/ckd     Subjective:     No complains today    OFF OF cardene drip     ?  Bre  has a past medical history of Heart murmur and Hypertension.  Bre  has no past surgical history on file.  His family history is not on file.  Bre  reports that he has been smoking cigarettes.  He has a 11.00 pack-year smoking history. He does not have any smokeless tobacco history on file. He reports that he does not drink alcohol. His drug history is not on file.  No current facility-administered medications on file prior to encounter.      No current outpatient medications on file prior to encounter.     Bre has No Known Allergies.  ?      Objective:   /85   Pulse 91   Temp 98 °F (36.7 °C) (Oral)   Resp (!) 37   Ht 6' (1.829 m)   Wt 104.6 kg (230 lb 9.6 oz)   SpO2 97%   BMI 31.28 kg/m²   Gen - NAD, A+Ox4, not confused  Gait - normal  HEENT/ NECK - EOMI/, NCAT, no JVD   CVS - RRR, no m/r/s3/s4  Resp - CTAB, no w/r/r  Abd - soft, NT, ND   Ext - no c/c/e  Psych - normal affect and behavior  Neuro - no asterixis  Laboratory:  Recent Results (from the past 24 hour(s))   POCT glucose    Collection Time: 11/27/18 12:15 PM   Result Value Ref Range    POCT Glucose 110 70 - 110 mg/dL   Iron and TIBC    Collection Time: 11/27/18 12:30 PM   Result Value Ref Range    Iron 54 45 - 160 ug/dL    Transferrin 178 (L) 200 - 375 mg/dL    TIBC 263 250 - 450 ug/dL    Saturated Iron 21 20 - 50 %   Ferritin    Collection Time: 11/27/18 12:30 PM   Result Value Ref Range    Ferritin 135 20.0 - 300.0 ng/mL   Vitamin D 25 hydroxy    Collection Time: 11/27/18 12:30 PM   Result Value Ref Range    Vit D, 25-Hydroxy 17 (L) 30 - 96 ng/mL   Uric acid    Collection Time: 11/27/18 12:30 PM   Result Value Ref Range    Uric Acid 6.9 3.4 - 7.0 mg/dL   Basic metabolic panel    Collection Time: 11/27/18  8:57 PM   Result Value Ref Range    Sodium 137 136 - 145 mmol/L    Potassium 3.6 3.5 - 5.1 mmol/L    Chloride 103 95  - 110 mmol/L    CO2 24 23 - 29 mmol/L    Glucose 119 (H) 70 - 110 mg/dL    BUN, Bld 48 (H) 6 - 20 mg/dL    Creatinine 4.6 (H) 0.5 - 1.4 mg/dL    Calcium 9.4 8.7 - 10.5 mg/dL    Anion Gap 10 8 - 16 mmol/L    eGFR if African American 16 (A) >60 mL/min/1.73 m^2    eGFR if non African American 14 (A) >60 mL/min/1.73 m^2   Comprehensive Metabolic Panel (CMP)    Collection Time: 11/28/18  3:49 AM   Result Value Ref Range    Sodium 136 136 - 145 mmol/L    Potassium 3.8 3.5 - 5.1 mmol/L    Chloride 101 95 - 110 mmol/L    CO2 24 23 - 29 mmol/L    Glucose 110 70 - 110 mg/dL    BUN, Bld 47 (H) 6 - 20 mg/dL    Creatinine 4.5 (H) 0.5 - 1.4 mg/dL    Calcium 9.8 8.7 - 10.5 mg/dL    Total Protein 7.5 6.0 - 8.4 g/dL    Albumin 3.7 3.5 - 5.2 g/dL    Total Bilirubin 0.8 0.1 - 1.0 mg/dL    Alkaline Phosphatase 80 55 - 135 U/L    AST 16 10 - 40 U/L    ALT 12 10 - 44 U/L    Anion Gap 11 8 - 16 mmol/L    eGFR if African American 16 (A) >60 mL/min/1.73 m^2    eGFR if non African American 14 (A) >60 mL/min/1.73 m^2   Magnesium    Collection Time: 11/28/18  3:49 AM   Result Value Ref Range    Magnesium 2.4 1.6 - 2.6 mg/dL   Phosphorus    Collection Time: 11/28/18  3:49 AM   Result Value Ref Range    Phosphorus 4.7 (H) 2.7 - 4.5 mg/dL   CBC with Automated Differential    Collection Time: 11/28/18  3:49 AM   Result Value Ref Range    WBC 11.05 3.90 - 12.70 K/uL    RBC 4.95 4.60 - 6.20 M/uL    Hemoglobin 13.9 (L) 14.0 - 18.0 g/dL    Hematocrit 40.6 40.0 - 54.0 %    MCV 82 82 - 98 fL    MCH 28.1 27.0 - 31.0 pg    MCHC 34.2 32.0 - 36.0 g/dL    RDW 14.4 11.5 - 14.5 %    Platelets 219 150 - 350 K/uL    MPV 10.1 9.2 - 12.9 fL    Gran # (ANC) 8.5 (H) 1.8 - 7.7 K/uL    Lymph # 1.6 1.0 - 4.8 K/uL    Mono # 0.7 0.3 - 1.0 K/uL    Eos # 0.2 0.0 - 0.5 K/uL    Baso # 0.02 0.00 - 0.20 K/uL    Gran% 77.1 (H) 38.0 - 73.0 %    Lymph% 14.4 (L) 18.0 - 48.0 %    Mono% 6.5 4.0 - 15.0 %    Eosinophil% 1.6 0.0 - 8.0 %    Basophil% 0.2 0.0 - 1.9 %    Differential  Method Automated      Recent Labs   Lab 11/26/18  1112 11/27/18 0337 11/28/18 0349   WBC 8.11 9.23 11.05   HGB 12.8* 12.2* 13.9*   HCT 38.0* 35.6* 40.6    185 219   MCV 83 82 82     Recent Labs   Lab 11/27/18  0337 11/27/18 2057 11/28/18 0349    137 136   K 3.3* 3.6 3.8    103 101   CO2 25 24 24   BUN 49* 48* 47*   GLU 92 119* 110   CALCIUM 8.9 9.4 9.8   MG 2.0  --  2.4   PHOS 5.3*  --  4.7*     Recent Labs   Lab 11/26/18  1112 11/27/18 0337 11/28/18 0349   PROT 6.9 6.4 7.5   ALBUMIN 3.4* 3.2* 3.7   BILITOT 0.7 0.7 0.8   AST 18 16 16   ALT 13 11 12   ALKPHOS 75 67 80     Recent Labs   Lab 11/26/18  1406   INR 1.0     Cardiac:   Recent Labs   Lab 11/26/18  1112 11/26/18 2001 11/27/18  0203 11/27/18  0913   TROPONINI 0.315*   < > 0.369* 0.457* 0.373*   BNP 1,083*  1,083*  --   --   --   --     < > = values in this interval not displayed.     FLP: No results found for: CHOL, HDL, LDLCALC, TRIG, CHOLHDL  DM:   Lab Results   Component Value Date    HGBA1C 5.1 11/26/2018    CREATININE 4.5 (H) 11/28/2018     Thyroid:   Lab Results   Component Value Date    TSH 1.426 11/26/2018     Anemia:   Lab Results   Component Value Date    IRON 54 11/27/2018    TIBC 263 11/27/2018    FERRITIN 135 11/27/2018     Urinalysis:   Lab Results   Component Value Date    COLORU Yellow 11/26/2018    SPECGRAV 1.010 11/26/2018    NITRITE Negative 11/26/2018    KETONESU Negative 11/26/2018    UROBILINOGEN Negative 11/26/2018     Assessment:   Creatinine is little better today good urine output. Lab stable not need HD at this moment follow up us at discharge.     LOPEZ / possible underline CKD   - ua + protein negative for blood   - not see a nephrology before   - cr 5.0 > 4.5   - hgb 12,8  - p/c 0.94  - uric acid 6.9    - Renal US Both kidneys are hyperechoic.  Both kidneys are small in size, both measuring 9 cm in length.  No hydronephrosis.Prostate is enlarged and irregular in contour.   - FEUr 65.8 consistent with ATN  possible 2/2 HTN EMERGENCY    FENA 7.5   hypertensive Emergency   Acute heart failure   MBD   - vit d 17   - ON SEVELAMER 800 TID   Not anemia   -iron S 21  - ferrtin 135   - hgb 13.9    Plan:     Follow up in nephrology clinic at discharge 1-2 weeks withDr. Nichols or [facility] with [facility] nephrology after discharge.  Sing off of case.  Keep bp > 130/90     Continue lasix 80 mg po bid   Consider ergocalciferol 50,000 uint a week    Not hd at this moment   Venosus mapping   CONSIDER Flomax, PSA  For Prostate is enlarged     Basil Mckinney  [facility] Ne[facility]rology PGY5    ?  ?

## 2018-11-28 NOTE — PROGRESS NOTES
Progress Note  [facility]  Admit Date: 11/26/2018   LOS: 2 days   SUBJECTIVE:   Follow-up For: SOB    Patient seen and examined this AM. Patient doing better than the day prior. Patient states that his headache has resolved.Patient's blood pressure medication has bene titrated. Blood pressure significantly better controlled compared to admission. Patient to be transferred to floor. Patient is tolerating PO diet, ambulating and has good UOP.     ROS  Constitutional: Positive for activity change. Negative for chills, fatigue and fever.   HENT: Negative for congestion and trouble swallowing.    Eyes: Negative for photophobia.   Respiratory: Positive for chest tightness, shortness of breath and wheezing. Negative for apnea and cough.    Cardiovascular: Positive for palpitations. Negative for chest pain and leg swelling.   Gastrointestinal: Negative for abdominal distention, abdominal pain, blood in stool, constipation, nausea and vomiting.   Endocrine: Negative for cold intolerance and polyuria.   Genitourinary: Negative for decreased urine volume, difficulty urinating, frequency and urgency.   Musculoskeletal: Negative for arthralgias, back pain and gait problem.   Skin: Negative for color change and pallor.   Allergic/Immunologic: Negative for immunocompromised state.   Neurological: Negative for dizziness and headaches.   Hematological: Negative for adenopathy.   Psychiatric/Behavioral: Negative for agitation      OBJECTIVE:   Vital Signs (Most Recent)  Temp: 97.4 °F (36.3 °C) (11/28/18 1105)  Pulse: 79 (11/28/18 1300)  Resp: 14 (11/28/18 1300)  BP: (!) 151/91 (11/28/18 1115)  SpO2: 98 % (11/28/18 1115)    I & O (Last 24H):    Intake/Output Summary (Last 24 hours) at 11/28/2018 1405  Last data filed at 11/28/2018 0800  Gross per 24 hour   Intake 1400 ml   Output 2550 ml   Net -1150 ml     Wt Readings from Last 3 Encounters:   11/28/18 104.6 kg (230 lb 9.6 oz)   12/26/13 108.9 kg (240 lb)       Current Diet  Order   Procedures    Diet Cardiac        Physical Exam  Constitutional: He is oriented to person, place, and time. He appears well-developed and well-nourished.   HENT:   Head: Normocephalic and atraumatic.   Eyes: Conjunctivae and EOM are normal. Pupils are equal, round, and reactive to light.   Neck: Normal range of motion. Neck supple.   Cardiovascular: Normal rate. Exam reveals gallop.   s4 noted on exam    Pulmonary/Chest: He has wheezes. He exhibits no tenderness.   Poor respiratory effort. Decreased breath sounds b/l. Expiratory wheezes noted in the upper lobe b/l    Abdominal: Soft. Bowel sounds are normal.   Musculoskeletal: Normal range of motion. He exhibits no edema.   Neurological: He is alert and oriented to person, place, and time.   Skin: Skin is warm and dry. Capillary refill takes less than 2 seconds.   Psychiatric: He has a normal mood and affect.     Laboratory Data:  CBC  Recent Labs   Lab 11/26/18  1112 11/27/18 0337 11/28/18  0349   WBC 8.11 9.23 11.05   RBC 4.59* 4.34* 4.95   HGB 12.8* 12.2* 13.9*   HCT 38.0* 35.6* 40.6    185 219   MCV 83 82 82   MCH 27.9 28.1 28.1   MCHC 33.7 34.3 34.2     CMP  Recent Labs   Lab 11/26/18  1112  11/27/18  0337 11/27/18 2057 11/28/18  0349   CALCIUM 9.4   < > 8.9 9.4 9.8   PROT 6.9  --  6.4  --  7.5      < > 138 137 136   K 4.3   < > 3.3* 3.6 3.8   CO2 28   < > 25 24 24      < > 102 103 101   BUN 46*   < > 49* 48* 47*   CREATININE 5.1*   < > 4.8* 4.6* 4.5*   ALKPHOS 75  --  67  --  80   ALT 13  --  11  --  12   AST 18  --  16  --  16   BILITOT 0.7  --  0.7  --  0.8    < > = values in this interval not displayed.     POCT-Glucose  POCT Glucose   Date Value Ref Range Status   11/27/2018 110 70 - 110 mg/dL Final   11/26/2018 92 70 - 110 mg/dL Final     COAGS  Recent Labs   Lab 11/26/18  1406   INR 1.0   APTT 28.0     UA  No results for input(s): COLORU, CLARITYU, SPECGRAV, PHUR, PROTEINUA, GLUCOSEU, BLOODU, WBCU, RBCU, BACTERIA, MUCUS in  the last 24 hours.    Invalid input(s):  BILIRUBINCON  MICRO  Microbiology Results (last 7 days)     ** No results found for the last 168 hours. **        Diagnostic Results:  Imaging Results          X-Ray Chest PA And Lateral (Final result)     Abnormal  Result time 11/26/18 11:20:28    Final result by Arnol Santana MD (11/26/18 11:20:28)                 Impression:      No radiographic evidence of acute cardiopulmonary process.    1 cm nodular opacity projecting over the mid aspect of the left midlung zone.  Consider further evaluation with nonemergent CT chest.    This report was flagged in Epic as abnormal.      Electronically signed by: Arnol Santana  Date:    11/26/2018  Time:    11:20             Narrative:    EXAMINATION:  XR CHEST PA AND LATERAL    CLINICAL HISTORY:  Shortness of breath    TECHNIQUE:  PA and lateral views of the chest were performed.    COMPARISON:  None    FINDINGS:  The lungs are symmetrically expanded.  Mediastinal structures are midline.  The cardiac silhouette is within normal limits.  No pneumothorax, consolidation, or pleural effusion is seen.  A 1 cm nodular opacity projects over the left midlung zone.  No acute osseous abnormality is identified.                                ASSESSMENT/PLAN:   Bre Reynolds is a 53 y.o. male    Neuro  GCS 15  A&Ox4  Pain control as tolerated    CVS  HTN emergency on presentation   Patient started on cardene drip now d/c  Currently titrating BP meds  Currently on nifedipine 30mg q24hr  Labetalol and hydralazine PRN with parameters  Patient with new onset heart failure   Troponin now trending up .457. Will repeat Tropin and EKG  Follow up repeat CXR this AM   Continue Carvedilol 12.5 BID  Continue Nifedipine 60mg PO daily    F/u inpatient cardiology recs     Pulmonary   Patient currently sat 100% on room air   Breathing improved since admission  No acute intervention at this time     GI/FEN  Will determine need for fluids this AM  Hypokalemia -  3.3 this AM will replenish. 40meq given PO    Phos elevated - phos binder started    Renal   Urine NA and Cr WNL  Pr:Cr ratio elevated at 43   Likely acute on chronic heart failure  Possible CKD 4/5. GFR 15   Avoid nephrotoxic agents   Start Lasix 80mg PO BID   Continue Tamsulosin .04mg PO daily   Follow up PTH, Uric Acid and Iron panel   Nephrology rec follow up 1-2 weeks following discharge.     Heme/ID  H/H 13.9/40.6   WBC wnl   Patient remains afebrile    Msk/Integ   No intervention at this time    Code: full  PPx: SCDs and Famotidine 20mg   Disp: Pending clinical improvement. Titration of BP meds and recs from card and nephro.       Jarod Tee MD  [facility], PGY-1

## 2018-11-28 NOTE — PLAN OF CARE
Problem: Patient Care Overview  Goal: Plan of Care Review  Pt AAOx4. Pt remained free of falls all shift. Educated on use of call light for assistance. Urinal within reach. PRN antihypertensives given for elevated BP readings. Call light within reach. Will continue to monitor.

## 2018-11-28 NOTE — PLAN OF CARE
Problem: Patient Care Overview  Goal: Plan of Care Review  Outcome: Ongoing (interventions implemented as appropriate)   11/28/18 9556   Coping/Psychosocial   Plan Of Care Reviewed With patient       Pt remained stable today. Has telemetry orders, and is awaiting bed placement. OOB to chair for the majority of the shift. Eating % of meals. Patient is anxious to get home. No s/s of distress. No c/o pain. All safety and comfort measures ensured. RN to the bedside to monitor.

## 2018-11-28 NOTE — PROGRESS NOTES
For reduced EF heart failure, would use hydralazine 25 TID along with the Isosorbide for mortality reduction.

## 2018-11-29 LAB
ALBUMIN SERPL BCP-MCNC: 3.6 G/DL
ALP SERPL-CCNC: 75 U/L
ALT SERPL W/O P-5'-P-CCNC: 9 U/L
ANION GAP SERPL CALC-SCNC: 9 MMOL/L
AST SERPL-CCNC: 11 U/L
BASOPHILS # BLD AUTO: 0.03 K/UL
BASOPHILS NFR BLD: 0.3 %
BILIRUB SERPL-MCNC: 0.7 MG/DL
BUN SERPL-MCNC: 53 MG/DL
CALCIUM SERPL-MCNC: 9.6 MG/DL
CHLORIDE SERPL-SCNC: 101 MMOL/L
CO2 SERPL-SCNC: 26 MMOL/L
CREAT SERPL-MCNC: 5 MG/DL
DIFFERENTIAL METHOD: ABNORMAL
EOSINOPHIL # BLD AUTO: 0.2 K/UL
EOSINOPHIL NFR BLD: 1.7 %
ERYTHROCYTE [DISTWIDTH] IN BLOOD BY AUTOMATED COUNT: 14.3 %
EST. GFR  (AFRICAN AMERICAN): 14 ML/MIN/1.73 M^2
EST. GFR  (NON AFRICAN AMERICAN): 12 ML/MIN/1.73 M^2
GLUCOSE SERPL-MCNC: 111 MG/DL
HCT VFR BLD AUTO: 39.3 %
HGB BLD-MCNC: 13.6 G/DL
LYMPHOCYTES # BLD AUTO: 2 K/UL
LYMPHOCYTES NFR BLD: 18.8 %
MAGNESIUM SERPL-MCNC: 2.4 MG/DL
MCH RBC QN AUTO: 27.9 PG
MCHC RBC AUTO-ENTMCNC: 34.6 G/DL
MCV RBC AUTO: 81 FL
MONOCYTES # BLD AUTO: 0.8 K/UL
MONOCYTES NFR BLD: 8 %
NEUTROPHILS # BLD AUTO: 7.4 K/UL
NEUTROPHILS NFR BLD: 71 %
PHOSPHATE SERPL-MCNC: 4.8 MG/DL
PLATELET # BLD AUTO: 224 K/UL
PMV BLD AUTO: 10.2 FL
POTASSIUM SERPL-SCNC: 3.6 MMOL/L
PROT SERPL-MCNC: 7.3 G/DL
RBC # BLD AUTO: 4.87 M/UL
SODIUM SERPL-SCNC: 136 MMOL/L
WBC # BLD AUTO: 10.42 K/UL

## 2018-11-29 PROCEDURE — 25000003 PHARM REV CODE 250: Performed by: STUDENT IN AN ORGANIZED HEALTH CARE EDUCATION/TRAINING PROGRAM

## 2018-11-29 PROCEDURE — 94761 N-INVAS EAR/PLS OXIMETRY MLT: CPT

## 2018-11-29 PROCEDURE — 80053 COMPREHEN METABOLIC PANEL: CPT

## 2018-11-29 PROCEDURE — 63600175 PHARM REV CODE 636 W HCPCS: Performed by: FAMILY MEDICINE

## 2018-11-29 PROCEDURE — 25000003 PHARM REV CODE 250: Performed by: INTERNAL MEDICINE

## 2018-11-29 PROCEDURE — 85025 COMPLETE CBC W/AUTO DIFF WBC: CPT

## 2018-11-29 PROCEDURE — 84100 ASSAY OF PHOSPHORUS: CPT

## 2018-11-29 PROCEDURE — 83735 ASSAY OF MAGNESIUM: CPT

## 2018-11-29 PROCEDURE — 11000001 HC ACUTE MED/SURG PRIVATE ROOM

## 2018-11-29 PROCEDURE — 36415 COLL VENOUS BLD VENIPUNCTURE: CPT

## 2018-11-29 RX ORDER — ISOSORBIDE DINITRATE 10 MG/1
20 TABLET ORAL 3 TIMES DAILY
Status: DISCONTINUED | OUTPATIENT
Start: 2018-11-29 | End: 2018-11-30 | Stop reason: HOSPADM

## 2018-11-29 RX ORDER — CARVEDILOL 12.5 MG/1
12.5 TABLET ORAL 2 TIMES DAILY WITH MEALS
Status: DISCONTINUED | OUTPATIENT
Start: 2018-11-30 | End: 2018-11-30

## 2018-11-29 RX ADMIN — FUROSEMIDE 80 MG: 40 TABLET ORAL at 05:11

## 2018-11-29 RX ADMIN — FUROSEMIDE 80 MG: 40 TABLET ORAL at 08:11

## 2018-11-29 RX ADMIN — TAMSULOSIN HYDROCHLORIDE 0.4 MG: 0.4 CAPSULE ORAL at 08:11

## 2018-11-29 RX ADMIN — ACETAMINOPHEN 650 MG: 325 TABLET ORAL at 12:11

## 2018-11-29 RX ADMIN — ISOSORBIDE DINITRATE 20 MG: 10 TABLET ORAL at 03:11

## 2018-11-29 RX ADMIN — NIFEDIPINE 90 MG: 60 TABLET, FILM COATED, EXTENDED RELEASE ORAL at 06:11

## 2018-11-29 RX ADMIN — ACETAMINOPHEN 650 MG: 325 TABLET ORAL at 08:11

## 2018-11-29 RX ADMIN — HYDRALAZINE HYDROCHLORIDE 10 MG: 20 INJECTION INTRAMUSCULAR; INTRAVENOUS at 12:11

## 2018-11-29 RX ADMIN — LABETALOL HYDROCHLORIDE 10 MG: 5 INJECTION, SOLUTION INTRAVENOUS at 05:11

## 2018-11-29 RX ADMIN — ISOSORBIDE DINITRATE 20 MG: 10 TABLET ORAL at 08:11

## 2018-11-29 RX ADMIN — SEVELAMER CARBONATE 800 MG: 800 TABLET, FILM COATED ORAL at 08:11

## 2018-11-29 RX ADMIN — CARVEDILOL 25 MG: 25 TABLET, FILM COATED ORAL at 08:11

## 2018-11-29 RX ADMIN — FAMOTIDINE 20 MG: 20 TABLET ORAL at 08:11

## 2018-11-29 RX ADMIN — SEVELAMER CARBONATE 800 MG: 800 TABLET, FILM COATED ORAL at 05:11

## 2018-11-29 RX ADMIN — ISOSORBIDE DINITRATE 20 MG: 10 TABLET ORAL at 11:11

## 2018-11-29 RX ADMIN — SPIRONOLACTONE 25 MG: 25 TABLET ORAL at 08:11

## 2018-11-29 NOTE — PLAN OF CARE
Updated Dr. Tee concerning about pt /116, HR 80 after the scheduled BP meds given. MD states will put new orders.

## 2018-11-29 NOTE — NURSING TRANSFER
Nursing Transfer Note      11/28/2018     Transfer To: [id number]    Transfer via wheelchair    Transfer with cardiac monitoring    Transported by EDDIE Jiang    Medicines sent: n/a    Chart send with patient: Yes    Notified: family; pt notified     Patient reassessed at: 2030, 11/28/2018    Upon arrival to floor: cardiac monitor applied, patient oriented to room, call bell in reach and bed in lowest position. EDDIE Hollis notified.

## 2018-11-29 NOTE — PROGRESS NOTES
Progress Note  [facility]  Admit Date: 11/26/2018   LOS: 3 days   SUBJECTIVE:   Follow-up For: SOB    Patient seen and examined this AM. Patient doing better than the day prior. Patient states that his headache has resolved.Patient's blood pressure medication has bene titrated. Blood pressure significantly better controlled compared to admission. Patient to be transferred to floor. Patient is tolerating PO diet, ambulating and has good UOP.     ROS  Constitutional: Positive for activity change. Negative for chills, fatigue and fever.   HENT: Negative for congestion and trouble swallowing.    Eyes: Negative for photophobia.   Respiratory: Positive for chest tightness, shortness of breath and wheezing. Negative for apnea and cough.    Cardiovascular: Positive for palpitations. Negative for chest pain and leg swelling.   Gastrointestinal: Negative for abdominal distention, abdominal pain, blood in stool, constipation, nausea and vomiting.   Endocrine: Negative for cold intolerance and polyuria.   Genitourinary: Negative for decreased urine volume, difficulty urinating, frequency and urgency.   Musculoskeletal: Negative for arthralgias, back pain and gait problem.   Skin: Negative for color change and pallor.   Allergic/Immunologic: Negative for immunocompromised state.   Neurological: Negative for dizziness and headaches.   Hematological: Negative for adenopathy.   Psychiatric/Behavioral: Negative for agitation      OBJECTIVE:   Vital Signs (Most Recent)  Temp: 97.2 °F (36.2 °C) (11/29/18 1203)  Pulse: 84 (11/29/18 1208)  Resp: 17 (11/29/18 1203)  BP: (!) 103/56 (11/29/18 1203)  SpO2: 99 % (11/29/18 1129)    I & O (Last 24H):    Intake/Output Summary (Last 24 hours) at 11/29/2018 1317  Last data filed at 11/29/2018 1200  Gross per 24 hour   Intake 1530 ml   Output 1300 ml   Net 230 ml     Wt Readings from Last 3 Encounters:   11/28/18 104.6 kg (230 lb 9.6 oz)   12/26/13 108.9 kg (240 lb)       Current Diet  Order   Procedures    Diet Cardiac        Physical Exam  Constitutional: He is oriented to person, place, and time. He appears well-developed and well-nourished.   HENT:   Head: Normocephalic and atraumatic.   Eyes: Conjunctivae and EOM are normal. Pupils are equal, round, and reactive to light.   Neck: Normal range of motion. Neck supple.   Cardiovascular: Normal rate. Exam reveals gallop.   s4 noted on exam    Pulmonary/Chest: He has wheezes. He exhibits no tenderness.   Poor respiratory effort. Decreased breath sounds b/l. Expiratory wheezes noted in the upper lobe b/l    Abdominal: Soft. Bowel sounds are normal.   Musculoskeletal: Normal range of motion. He exhibits no edema.   Neurological: He is alert and oriented to person, place, and time.   Skin: Skin is warm and dry. Capillary refill takes less than 2 seconds.   Psychiatric: He has a normal mood and affect.     Laboratory Data:  CBC  Recent Labs   Lab 11/27/18 0337 11/28/18 0349 11/29/18  0611   WBC 9.23 11.05 10.42   RBC 4.34* 4.95 4.87   HGB 12.2* 13.9* 13.6*   HCT 35.6* 40.6 39.3*    219 224   MCV 82 82 81*   MCH 28.1 28.1 27.9   MCHC 34.3 34.2 34.6     CMP  Recent Labs   Lab 11/27/18 0337 11/27/18 2057 11/28/18 0349 11/29/18  0611   CALCIUM 8.9 9.4 9.8 9.6   PROT 6.4  --  7.5 7.3    137 136 136   K 3.3* 3.6 3.8 3.6   CO2 25 24 24 26    103 101 101   BUN 49* 48* 47* 53*   CREATININE 4.8* 4.6* 4.5* 5.0*   ALKPHOS 67  --  80 75   ALT 11  --  12 9*   AST 16  --  16 11   BILITOT 0.7  --  0.8 0.7     POCT-Glucose  POCT Glucose   Date Value Ref Range Status   11/27/2018 110 70 - 110 mg/dL Final   11/26/2018 92 70 - 110 mg/dL Final     COAGS  Recent Labs   Lab 11/26/18  1406   INR 1.0   APTT 28.0     UA  No results for input(s): COLORU, CLARITYU, SPECGRAV, PHUR, PROTEINUA, GLUCOSEU, BLOODU, WBCU, RBCU, BACTERIA, MUCUS in the last 24 hours.    Invalid input(s):  BILIRUBINCON  MICRO  Microbiology Results (last 7 days)     ** No results  found for the last 168 hours. **        Diagnostic Results:  Imaging Results          X-Ray Chest PA And Lateral (Final result)     Abnormal  Result time 11/26/18 11:20:28    Final result by Arnol Santana MD (11/26/18 11:20:28)                 Impression:      No radiographic evidence of acute cardiopulmonary process.    1 cm nodular opacity projecting over the mid aspect of the left midlung zone.  Consider further evaluation with nonemergent CT chest.    This report was flagged in Epic as abnormal.      Electronically signed by: Arnol Santana  Date:    11/26/2018  Time:    11:20             Narrative:    EXAMINATION:  XR CHEST PA AND LATERAL    CLINICAL HISTORY:  Shortness of breath    TECHNIQUE:  PA and lateral views of the chest were performed.    COMPARISON:  None    FINDINGS:  The lungs are symmetrically expanded.  Mediastinal structures are midline.  The cardiac silhouette is within normal limits.  No pneumothorax, consolidation, or pleural effusion is seen.  A 1 cm nodular opacity projects over the left midlung zone.  No acute osseous abnormality is identified.                                ASSESSMENT/PLAN:   54 y/o male presents to the ED complaining of SOB in Hypertensive Emergency      Hypertensive Emergency   /135 on presentation   Nephropathy likely 2/2  To HTN with ElevatedBun/Cr  Patient given  Lasix 60mg IV, Hydralazine 10mg IV and Labetalol 20mg IV one time.   Tox screen pending  Patient's BP decreased by 10-20 percent in the first hour   Will lower patient's BP 5-15 percent over the next 23 hours per tx guidelines  Patient now transitioned on to the floor from Cardene drip.  Currently on Carvedilol 12.5mg BID, Isosorbide dinitrate 20mg TID, Nifedpine 90mg and Spironolactone 25mg    Acute Heart Failure   Patient with new onset heart failure   BNP elevated to 1,083   Patient with decreased Breath sounds B/l  Elevated Troponin .315, now stable   Patient on Lasix 80mg BID    Chronic Kidney  Injury   -Intrarenal disease likely 2/2 HTN  -BUN:CR ratio 53/5.0   -GFR 12  -Nephrology consult placed   -Monitor UOP      Code: full  PPx: SCDs and Famotidine 20mg   Disp: Pending clinical improvement. Titration of BP meds and recs from card and nephro.       Jarod Tee MD  [facility], PGY-1

## 2018-11-29 NOTE — PROGRESS NOTES
Notified Dr. Newsome of pt being hypertensive after receiving PRN antihypertensives. Pt manual /114.  New orders in chart.  Will continue to monitor.

## 2018-11-29 NOTE — PLAN OF CARE
Pt reported feeling lightheadedness but denies any SOB and headache. Orthostatic /81, HR 78 (lying), 126/84, HR 83 (sitting), 154/112,  (Standing) While standing up pt complaints of feeling high and wuzzy and lightheadedness. Nurse put pt back to bed with bed alarm on. Informed Dr. Tee and aware.

## 2018-11-29 NOTE — PROGRESS NOTES
.Pharmacy New Medication Education    Patient accepted medication education.    Pharmacy educated patient on name and purpose of medications and possible side effects, using the teach-back method.     Current Inpatient Medication Orders   0.9% NaCl infusion   acetaminophen tablet 650 mg   carvedilol tablet 25 mg   dextrose 50% injection 12.5 g   famotidine tablet 20 mg   furosemide tablet 80 mg   hydrALAZINE injection 10 mg   labetalol 20 mg/4 mL (5 mg/mL) IV syring   NIFEdipine 24 hr tablet 90 mg   ondansetron disintegrating tablet 8 mg   pneumoc 13-elnior conj-dip cr(PF) (PREVNAR 13 (PF)) 0.5 mL   sevelamer carbonate tablet 800 mg   sodium chloride 0.9% flush 5 mL   spironolactone tablet 25 mg   tamsulosin 24 hr capsule 0.4 mg       Learners of pharmacy medication education included:  Patient    Patient +/- learner response:  Verbalized Understanding, Teachback

## 2018-11-29 NOTE — PHYSICIAN QUERY
"PT Name: Bre Reynolds  MR #: [id number]    Physician Query Form - Heart  Condition Clarification     CDS/: Cindi Gross               Contact information: [email or web address]    This form is a permanent document in the medical record.     Query Date: November 29, 2018    By submitting this query, we are merely seeking further clarification of documentation. Please utilize your independent clinical judgment when addressing the question(s) below.    The medical record contains the following   Indicators     Supporting Clinical Findings Location in Medical Record   x BNP 1083 Lab 11/26    x EF · . The estimated ejection fraction is 25% TTE 11/26     Radiology findings     x Echo Results · Concentric left ventricular hypertrophy.  · Moderate left atrial enlargement.  · Moderate left ventricular enlargement.  · Severely decreased left ventricular systolic function. The estimated ejection fraction is 25%  · Left ventricular diastolic dysfunction.  · Normal right ventricular systolic function.  · Normal atrial size.  · Mild mitral regurgitation.  · Trace aortic regurgitation.  · Mild tricuspid regurgitation.  · Normal central venous pressure (3 mm Hg).  · The estimated PA systolic pressure is 27.01 mm Hg  · Possible noncompaction TTE 11/26     "Ascites" documented      "SOB" or "CASTRO" documented      "Hypoxia" documented     x Heart Failure documented For reduced EF heart failure, would use hydralazine 25 TID along with the Isosorbide for mortality reduction.     Patient with new onset heart failure      Cards note 11/28          note 11/28     "Edema" documented     x Diuretics/Meds furosemide tablet 80 mg   Dose: 80 mg  Freq: 2 times daily Route: Oral  Start: 11/27/18 1115 MAR     Treatment:      Other:      Heart failure (HF) can be acute, chronic or both. It is generally further specificed as systolic, diastolic, or combined. Lastly, it is important to identify an underlying etiology if known or suspected. "     Common clues to acute exacerbation:  Rapidly progressive symptoms (w/in 2 weeks of presentation), using IV diuretics to treat, using supplemental O2, pulmonary edema on Xray, MI w/in 4 weeks, and/or BNP >500    Systolic Heart Failure: is defined as chart documentation of a left ventricular ejection fraction (LVEF) less than 40%     Diastolic Heart Failure: is defined as a left ventricular ejection fraction (LVEF) greater than 40%   +      Evidence of diastolic dysfunction on echocardiography OR    Right heart catheterization wedge pressure above 12 mm Hg OR    Left heart catheterization left ventricular end diastolic pressure 18 mm Hg or above.    References: *American Heart Association    The clinical guidelines noted below are only system guidelines, and do not replace the providers clinical judgment.     Provider, please specify the diagnosis associated with above clinical findings  [X] Acute Systolic Heart Failure - New diagnosis.  EF < 40%  and acute HF symptoms documented  [   ] Acute Combined Systolic and Diastolic Heart Failure   [   ] Other Type of Heart Failure (please specify type): _________________________  [   ] Heart Failure Ruled Out  [   ] Other (please specify): ___________________________________  [   ] Clinically Undetermined                          Please document in your progress notes daily for the duration of treatment until resolved and include in your discharge summary.

## 2018-11-29 NOTE — NURSING
Report to EDDIE Chun on 4th floor at this time. Bed is in the process of being cleaned. Pt to be transferred after room becomes available.

## 2018-11-30 VITALS
RESPIRATION RATE: 18 BRPM | WEIGHT: 230.63 LBS | SYSTOLIC BLOOD PRESSURE: 145 MMHG | TEMPERATURE: 98 F | HEART RATE: 79 BPM | DIASTOLIC BLOOD PRESSURE: 95 MMHG | BODY MASS INDEX: 31.24 KG/M2 | HEIGHT: 72 IN | OXYGEN SATURATION: 98 %

## 2018-11-30 LAB
ALBUMIN SERPL BCP-MCNC: 3.3 G/DL
ALP SERPL-CCNC: 69 U/L
ALT SERPL W/O P-5'-P-CCNC: 8 U/L
ANION GAP SERPL CALC-SCNC: 11 MMOL/L
AST SERPL-CCNC: 10 U/L
BASOPHILS # BLD AUTO: 0.04 K/UL
BASOPHILS NFR BLD: 0.4 %
BILIRUB SERPL-MCNC: 0.5 MG/DL
BUN SERPL-MCNC: 61 MG/DL
CALCIUM SERPL-MCNC: 9.1 MG/DL
CHLORIDE SERPL-SCNC: 98 MMOL/L
CO2 SERPL-SCNC: 25 MMOL/L
CREAT SERPL-MCNC: 5.5 MG/DL
DIFFERENTIAL METHOD: ABNORMAL
EOSINOPHIL # BLD AUTO: 0.3 K/UL
EOSINOPHIL NFR BLD: 2.8 %
ERYTHROCYTE [DISTWIDTH] IN BLOOD BY AUTOMATED COUNT: 14.1 %
EST. GFR  (AFRICAN AMERICAN): 13 ML/MIN/1.73 M^2
EST. GFR  (NON AFRICAN AMERICAN): 11 ML/MIN/1.73 M^2
GLUCOSE SERPL-MCNC: 95 MG/DL
HCT VFR BLD AUTO: 36.1 %
HGB BLD-MCNC: 12.4 G/DL
LYMPHOCYTES # BLD AUTO: 2.7 K/UL
LYMPHOCYTES NFR BLD: 26.1 %
MAGNESIUM SERPL-MCNC: 2.4 MG/DL
MCH RBC QN AUTO: 27.7 PG
MCHC RBC AUTO-ENTMCNC: 34.3 G/DL
MCV RBC AUTO: 81 FL
MONOCYTES # BLD AUTO: 1.2 K/UL
MONOCYTES NFR BLD: 11.4 %
NEUTROPHILS # BLD AUTO: 6 K/UL
NEUTROPHILS NFR BLD: 59 %
PHOSPHATE SERPL-MCNC: 6.1 MG/DL
PLATELET # BLD AUTO: 246 K/UL
PMV BLD AUTO: 10.3 FL
POTASSIUM SERPL-SCNC: 3.6 MMOL/L
PROT SERPL-MCNC: 6.8 G/DL
RBC # BLD AUTO: 4.48 M/UL
SODIUM SERPL-SCNC: 134 MMOL/L
WBC # BLD AUTO: 10.15 K/UL

## 2018-11-30 PROCEDURE — 85025 COMPLETE CBC W/AUTO DIFF WBC: CPT

## 2018-11-30 PROCEDURE — 94761 N-INVAS EAR/PLS OXIMETRY MLT: CPT

## 2018-11-30 PROCEDURE — 36415 COLL VENOUS BLD VENIPUNCTURE: CPT

## 2018-11-30 PROCEDURE — 83735 ASSAY OF MAGNESIUM: CPT

## 2018-11-30 PROCEDURE — 84100 ASSAY OF PHOSPHORUS: CPT

## 2018-11-30 PROCEDURE — 80053 COMPREHEN METABOLIC PANEL: CPT

## 2018-11-30 PROCEDURE — 25000003 PHARM REV CODE 250: Performed by: STUDENT IN AN ORGANIZED HEALTH CARE EDUCATION/TRAINING PROGRAM

## 2018-11-30 PROCEDURE — 25000003 PHARM REV CODE 250: Performed by: FAMILY MEDICINE

## 2018-11-30 PROCEDURE — 25000003 PHARM REV CODE 250: Performed by: INTERNAL MEDICINE

## 2018-11-30 RX ORDER — SEVELAMER CARBONATE 800 MG/1
1600 TABLET, FILM COATED ORAL 2 TIMES DAILY WITH MEALS
Qty: 120 TABLET | Refills: 1 | Status: SHIPPED | OUTPATIENT
Start: 2018-12-01 | End: 2019-12-01

## 2018-11-30 RX ORDER — CARVEDILOL 25 MG/1
25 TABLET ORAL 2 TIMES DAILY WITH MEALS
Qty: 60 TABLET | Refills: 1 | Status: SHIPPED | OUTPATIENT
Start: 2018-12-01 | End: 2019-12-01

## 2018-11-30 RX ORDER — ISOSORBIDE DINITRATE 20 MG/1
20 TABLET ORAL 3 TIMES DAILY
Qty: 90 TABLET | Refills: 1 | Status: SHIPPED | OUTPATIENT
Start: 2018-11-30 | End: 2019-11-30

## 2018-11-30 RX ORDER — HYDRALAZINE HYDROCHLORIDE 25 MG/1
25 TABLET, FILM COATED ORAL EVERY 8 HOURS
Status: DISCONTINUED | OUTPATIENT
Start: 2018-11-30 | End: 2018-11-30 | Stop reason: HOSPADM

## 2018-11-30 RX ORDER — NIFEDIPINE 90 MG/1
90 TABLET, EXTENDED RELEASE ORAL DAILY
Qty: 30 TABLET | Refills: 1 | Status: SHIPPED | OUTPATIENT
Start: 2018-12-01 | End: 2019-12-01

## 2018-11-30 RX ORDER — CARVEDILOL 25 MG/1
25 TABLET ORAL 2 TIMES DAILY WITH MEALS
Status: DISCONTINUED | OUTPATIENT
Start: 2018-11-30 | End: 2018-11-30 | Stop reason: HOSPADM

## 2018-11-30 RX ORDER — CARVEDILOL 12.5 MG/1
12.5 TABLET ORAL ONCE
Status: COMPLETED | OUTPATIENT
Start: 2018-11-30 | End: 2018-11-30

## 2018-11-30 RX ORDER — HYDRALAZINE HYDROCHLORIDE 25 MG/1
25 TABLET, FILM COATED ORAL EVERY 8 HOURS
Qty: 90 TABLET | Refills: 1 | Status: SHIPPED | OUTPATIENT
Start: 2018-11-30 | End: 2019-11-30

## 2018-11-30 RX ORDER — SEVELAMER CARBONATE 800 MG/1
1600 TABLET, FILM COATED ORAL 2 TIMES DAILY WITH MEALS
Status: DISCONTINUED | OUTPATIENT
Start: 2018-11-30 | End: 2018-11-30 | Stop reason: HOSPADM

## 2018-11-30 RX ORDER — FUROSEMIDE 80 MG/1
80 TABLET ORAL 2 TIMES DAILY
Qty: 60 TABLET | Refills: 1 | Status: SHIPPED | OUTPATIENT
Start: 2018-12-01 | End: 2019-12-01

## 2018-11-30 RX ORDER — TAMSULOSIN HYDROCHLORIDE 0.4 MG/1
0.4 CAPSULE ORAL DAILY
Qty: 30 CAPSULE | Refills: 1 | Status: SHIPPED | OUTPATIENT
Start: 2018-12-01 | End: 2019-12-01

## 2018-11-30 RX ORDER — SPIRONOLACTONE 25 MG/1
25 TABLET ORAL DAILY
Qty: 30 TABLET | Refills: 1 | Status: SHIPPED | OUTPATIENT
Start: 2018-12-01 | End: 2019-12-01

## 2018-11-30 RX ADMIN — SEVELAMER CARBONATE 1600 MG: 800 TABLET, FILM COATED ORAL at 05:11

## 2018-11-30 RX ADMIN — NIFEDIPINE 90 MG: 60 TABLET, FILM COATED, EXTENDED RELEASE ORAL at 08:11

## 2018-11-30 RX ADMIN — FUROSEMIDE 80 MG: 40 TABLET ORAL at 05:11

## 2018-11-30 RX ADMIN — TAMSULOSIN HYDROCHLORIDE 0.4 MG: 0.4 CAPSULE ORAL at 08:11

## 2018-11-30 RX ADMIN — CARVEDILOL 25 MG: 25 TABLET, FILM COATED ORAL at 05:11

## 2018-11-30 RX ADMIN — SEVELAMER CARBONATE 800 MG: 800 TABLET, FILM COATED ORAL at 08:11

## 2018-11-30 RX ADMIN — ISOSORBIDE DINITRATE 20 MG: 10 TABLET ORAL at 08:11

## 2018-11-30 RX ADMIN — ISOSORBIDE DINITRATE 20 MG: 10 TABLET ORAL at 02:11

## 2018-11-30 RX ADMIN — HYDRALAZINE HYDROCHLORIDE 25 MG: 25 TABLET, FILM COATED ORAL at 02:11

## 2018-11-30 RX ADMIN — ACETAMINOPHEN 650 MG: 325 TABLET ORAL at 10:11

## 2018-11-30 RX ADMIN — FUROSEMIDE 80 MG: 40 TABLET ORAL at 08:11

## 2018-11-30 RX ADMIN — CARVEDILOL 25 MG: 25 TABLET, FILM COATED ORAL at 08:11

## 2018-11-30 RX ADMIN — SPIRONOLACTONE 25 MG: 25 TABLET ORAL at 08:11

## 2018-11-30 RX ADMIN — CARVEDILOL 12.5 MG: 12.5 TABLET, FILM COATED ORAL at 01:11

## 2018-11-30 RX ADMIN — HYDRALAZINE HYDROCHLORIDE 25 MG: 25 TABLET, FILM COATED ORAL at 10:11

## 2018-11-30 NOTE — PLAN OF CARE
Problem: Patient Care Overview  Goal: Plan of Care Review  Outcome: Ongoing (interventions implemented as appropriate)  Plan of care reviewed patient verbalized understanding, medications administered. Cardiac monitoring NSR. BP during the night elevated notified Dr. Mixon place an order for BP medications, rechecked patients BP and it decreased. Bed in lowest position, call bell within reach, bed alarm on.

## 2018-11-30 NOTE — PROGRESS NOTES
Progress Note  [facility]  Admit Date: 11/26/2018   LOS: 4 days   SUBJECTIVE:   Follow-up For: SOB    Overnight BP was elevated and was given one time dose of coreg 12.5mg. Patient seen and examined this AM. Blood pressure remains uncontrolled. Patient to be transferred to floor. Patient is tolerating PO diet, ambulating and has good UOP.     ROS  Constitutional: Positive for activity change. Negative for chills, fatigue and fever.   HENT: Negative for congestion and trouble swallowing.    Eyes: Negative for photophobia.   Respiratory: Positive for chest tightness, shortness of breath and wheezing. Negative for apnea and cough.    Cardiovascular: Positive for palpitations. Negative for chest pain and leg swelling.   Gastrointestinal: Negative for abdominal distention, abdominal pain, blood in stool, constipation, nausea and vomiting.   Endocrine: Negative for cold intolerance and polyuria.   Genitourinary: Negative for decreased urine volume, difficulty urinating, frequency and urgency.   Musculoskeletal: Negative for arthralgias, back pain and gait problem.   Skin: Negative for color change and pallor.   Allergic/Immunologic: Negative for immunocompromised state.   Neurological: Negative for dizziness and headaches.   Hematological: Negative for adenopathy.   Psychiatric/Behavioral: Negative for agitation      OBJECTIVE:   Vital Signs (Most Recent)  Temp: 98.2 °F (36.8 °C) (11/30/18 0530)  Pulse: 83 (11/30/18 0530)  Resp: 20 (11/30/18 0530)  BP: (!) 167/110 (11/30/18 0530)  SpO2: 98 % (11/30/18 0126)    I & O (Last 24H):    Intake/Output Summary (Last 24 hours) at 11/30/2018 0608  Last data filed at 11/30/2018 0000  Gross per 24 hour   Intake 1790 ml   Output 650 ml   Net 1140 ml     Wt Readings from Last 3 Encounters:   11/28/18 104.6 kg (230 lb 9.6 oz)   12/26/13 108.9 kg (240 lb)       Current Diet Order   Procedures    Diet Cardiac        Physical Exam  Constitutional: He is oriented to person, place,  and time. He appears well-developed and well-nourished.   HENT:   Head: Normocephalic and atraumatic.   Eyes: Conjunctivae and EOM are normal. Pupils are equal, round, and reactive to light.   Neck: Normal range of motion. Neck supple.   Cardiovascular: Normal rate. Exam reveals gallop.   s4 noted on exam    Pulmonary/Chest: He has wheezes. He exhibits no tenderness.   Poor respiratory effort. Decreased breath sounds b/l. Expiratory wheezes noted in the upper lobe b/l    Abdominal: Soft. Bowel sounds are normal.   Musculoskeletal: Normal range of motion. He exhibits no edema.   Neurological: He is alert and oriented to person, place, and time.   Skin: Skin is warm and dry. Capillary refill takes less than 2 seconds.   Psychiatric: He has a normal mood and affect.     Laboratory Data:  CBC  Recent Labs   Lab 11/27/18 0337 11/28/18 0349 11/29/18  0611   WBC 9.23 11.05 10.42   RBC 4.34* 4.95 4.87   HGB 12.2* 13.9* 13.6*   HCT 35.6* 40.6 39.3*    219 224   MCV 82 82 81*   MCH 28.1 28.1 27.9   MCHC 34.3 34.2 34.6     CMP  Recent Labs   Lab 11/27/18 0337 11/27/18 2057 11/28/18 0349 11/29/18  0611   CALCIUM 8.9 9.4 9.8 9.6   PROT 6.4  --  7.5 7.3    137 136 136   K 3.3* 3.6 3.8 3.6   CO2 25 24 24 26    103 101 101   BUN 49* 48* 47* 53*   CREATININE 4.8* 4.6* 4.5* 5.0*   ALKPHOS 67  --  80 75   ALT 11  --  12 9*   AST 16  --  16 11   BILITOT 0.7  --  0.8 0.7     POCT-Glucose  POCT Glucose   Date Value Ref Range Status   11/27/2018 110 70 - 110 mg/dL Final     COAGS  Recent Labs   Lab 11/26/18  1406   INR 1.0   APTT 28.0     UA  No results for input(s): COLORU, CLARITYU, SPECGRAV, PHUR, PROTEINUA, GLUCOSEU, BLOODU, WBCU, RBCU, BACTERIA, MUCUS in the last 24 hours.    Invalid input(s):  BILIRUBINCON  MICRO  Microbiology Results (last 7 days)     ** No results found for the last 168 hours. **        Diagnostic Results:  Imaging Results          X-Ray Chest PA And Lateral (Final result)     Abnormal   Result time 11/26/18 11:20:28    Final result by Arnol Santana MD (11/26/18 11:20:28)                 Impression:      No radiographic evidence of acute cardiopulmonary process.    1 cm nodular opacity projecting over the mid aspect of the left midlung zone.  Consider further evaluation with nonemergent CT chest.    This report was flagged in Epic as abnormal.      Electronically signed by: Arnol Santana  Date:    11/26/2018  Time:    11:20             Narrative:    EXAMINATION:  XR CHEST PA AND LATERAL    CLINICAL HISTORY:  Shortness of breath    TECHNIQUE:  PA and lateral views of the chest were performed.    COMPARISON:  None    FINDINGS:  The lungs are symmetrically expanded.  Mediastinal structures are midline.  The cardiac silhouette is within normal limits.  No pneumothorax, consolidation, or pleural effusion is seen.  A 1 cm nodular opacity projects over the left midlung zone.  No acute osseous abnormality is identified.                                ASSESSMENT/PLAN:   52 y/o male presents to the ED complaining of SOB in Hypertensive Emergency      Orthostatic Hypertension   120/81, HR 78 (Supine)  126/84, HR 83 (Sitting),   154/112,  (Standing)  -Patient needs time to autoregulate as baseline BP was 200/100s prior to treatment  -Will need BP medication adjustment accordingly    Hypertensive Emergency   /135 on presentation   Nephropathy likely 2/2  To HTN with ElevatedBun/Cr  Patient given  Lasix 60mg IV, Hydralazine 10mg IV and Labetalol 20mg IV one time.   Tox screen pending  Patient's BP decreased by 10-20 percent in the first hour   Will lower patient's BP 5-15 percent over the next 23 hours per tx guidelines  Patient now transitioned on to the floor from Cardene drip.  Currently on Carvedilol 12.5mg BID, Isosorbide dinitrate 20mg TID, Nifedpine 90mg and Spironolactone 25mg  Blood pressure remains uncontrolled. Will likely need addition of blood pressure medication.     Acute Heart  Failure   Patient with new onset heart failure   BNP elevated to 1,083   Patient with decreased Breath sounds B/l  Elevated Troponin .315, now stable   Patient on Lasix 80mg BID    Chronic Kidney Injury   -Intrarenal disease likely 2/2 HTN  -Monitor UOP  -Nephrology consult placed   -Will need outpatient f/u from nephrology       Code: full  PPx: SCDs and Famotidine 20mg   Disp: Pending clinical improvement. Titration of BP meds and recs from card and nephro.       Jarod Tee MD  [facility], PGY-1

## 2018-11-30 NOTE — NURSING
Notified Dr. Mixon, BP elevated administered scheduled dose. BP remained elevated no signs or symptoms of distress.  Will put a new order for BP. Will continue to monitor.

## 2018-11-30 NOTE — PLAN OF CARE
Problem: Patient Care Overview  Goal: Plan of Care Review  Outcome: Ongoing (interventions implemented as appropriate)  Plan of care reviewed with the patient. Verbalized clear understanding. Bed alarm set. Bed in lowest position. Pt remain afebrile and free of fall. Urinal at the bedside. Call light within reach. Instructed pt to call when getting out of bed. Given tylenol for complaints of headache. NSR HR on telemetry monitor. No report of SOB or lightheadedness. I's & O's documented. Will continue to monitor.

## 2018-12-01 NOTE — PLAN OF CARE
Discharge orders noted, no HH or HME ordered.    Future Appointments   Date Time Provider Department Center   12/5/2018  3:20 PM Arianna Butler MD [facility]       Pt's nurse will go over medications/signs and symptoms prior to discharge       11/30/18 9282   Final Note   Assessment Type Final Discharge Note   Anticipated Discharge Disposition Home   What phone number can be called within the next 1-3 days to see how you are doing after discharge? [phone]   Hospital Follow Up  Appt(s) scheduled? Yes   Right Care Referral Info   Post Acute Recommendation No Care     Stephanie Nixon, RN Transitional Navigator  [phone]

## 2018-12-01 NOTE — NURSING
Patient discharge IV discontinued and Telemetry box returned. Discharged papers and RX given to patient teaching.

## 2018-12-04 NOTE — DISCHARGE SUMMARY
[facility]   Discharge Summary      Patient Name: Bre Reynolds  MRN: [id number]  Admission Date: 11/26/2018  Hospital Length of Stay: 4 days  Discharge Date and Time: 11/30/2018  8:49 PM  Attending Physician: Lewis Gaffney MD  Discharging Provider: Jarod Tee MD  Primary Care Provider: Arianna Butler MD   HPI:    Patient is a 53 y.o. male presents to the ED complaining of SOB x 2 weeks. Patient states that he now sleeps with 3 pillows at night. Patient becomes SOB when he lays flat. Patient now only able to climb one flight of stairs before becoming short of breath. Patient was aware of his diagnosis of HTN, however failed to take his medication due to non-compliance.      Hospital Course:  53 year old male presents with hypertensive emergency after failing to comply with medication use for the last 2 years. On presentation,  patients blood pressure was over 220s/120s,  with end organ damage  with renal function being ckd4 on going ckd5. Initial troponin was .315,  BNP was elevated at 1083.  tox screen negative. h/h stable. TTE showed EF Of 25%  indicative of systolic heart failure.  No acute  changes on EKG.  patient was transferred to the ICU for a higher level  of care. Patient was placed on a cardene drip. Patients blood pressure was decreased by 10 to 20% in the first hour and then lowered 5 to 15% over the next 23 hours as per protocol.  [facility] was consulted. No acute intervention needed as per Cardiology recommendations. Patient slowly transition from IV to PO blood pressure medications. A combination of Lasix,  Carvedilol,  isosorbide mononitrate, nifedipine  and hydralazine were added to control blood pressure. Patients blood pressure medications were titrated accordingly over the next couple of days from admission to achieve adequate control. By day 4, Patients symptoms dramatically improved,  volume overload was controlled,  and patients blood  pressure was better controlled. By day five,  patients blood pressure regimen was set and titrated according to tolerance and symptoms. Patient to have close  follow up with primary care physician in one week to discuss medication compliance and adequate blood pressure control coverage. Patient to follow with nephrology for CKD.       Consults:   Consults (From admission, onward)        Status Ordering Provider     Inpatient consult to [facility]  Once     Provider:  (Not yet assigned)    Completed [facility]          Significant Diagnostic Studies:   Imaging Results          X-Ray Chest PA And Lateral (Final result)     Abnormal  Result time 11/26/18 11:20:28    Final result by Arnol Santana MD (11/26/18 11:20:28)                 Impression:      No radiographic evidence of acute cardiopulmonary process.    1 cm nodular opacity projecting over the mid aspect of the left midlung zone.  Consider further evaluation with nonemergent CT chest.    This report was flagged in Epic as abnormal.      Electronically signed by: Arnol Santana  Date:    11/26/2018  Time:    11:20             Narrative:    EXAMINATION:  XR CHEST PA AND LATERAL    CLINICAL HISTORY:  Shortness of breath    TECHNIQUE:  PA and lateral views of the chest were performed.    COMPARISON:  None    FINDINGS:  The lungs are symmetrically expanded.  Mediastinal structures are midline.  The cardiac silhouette is within normal limits.  No pneumothorax, consolidation, or pleural effusion is seen.  A 1 cm nodular opacity projects over the left midlung zone.  No acute osseous abnormality is identified.                                  Pending Diagnostic Studies:     None        Final Active Diagnoses:    Diagnosis Date Noted POA    PRINCIPAL PROBLEM:  Hypertensive emergency [I16.1] 11/26/2018 Yes    SOB (shortness of breath) [R06.02]  Unknown    Hypertension [I10] 11/27/2018 Yes     Chronic    Systolic heart failure [I50.20] 11/27/2018 Yes     Acute kidney injury [N17.9] 11/26/2018 Yes      Problems Resolved During this Admission:      Discharged Condition: stable    Disposition: Home or Self Care    Follow Up:  Follow-up Information     [facility] On 12/5/2018.    Specialty:  Family Medicine  Why:  Time: 3:20--- establish care and hospital follow up; BRING photo ID, 2 proffs of income-- sliding scale fee will be applied, also bring all medications and discharge papers  Contact information:  [address], [address]  [facility] [address] [phone]               Patient Instructions:   Ambulatory referral to Nephrology   Referral Priority: Routine Referral Type: Consultation   Referral Reason: Specialty Services Required   Requested Specialty: Nephrology   Number of Visits Requested: 1                         Diet renal     Notify your health care provider if you experience any of the following:  temperature >100.4     Notify your health care provider if you experience any of the following:  persistent nausea and vomiting or diarrhea     Notify your health care provider if you experience any of the following:  severe uncontrolled pain     Notify your health care provider if you experience any of the following:  redness, tenderness, or signs of infection (pain, swelling, redness, odor or green/yellow discharge around incision site)     Notify your health care provider if you experience any of the following:  difficulty breathing or increased cough     Notify your health care provider if you experience any of the following:  severe persistent headache     Notify your health care provider if you experience any of the following:  worsening rash     Notify your health care provider if you experience any of the following:  persistent dizziness, light-headedness, or visual disturbances     Notify your health care provider if you experience any of the following:  increased confusion or weakness     Activity as tolerated      Medications:   Bre Reynolds   Home Medication Instructions JANET:[id number]    Printed on:12/04/18 1579   Medication Information                      carvedilol (COREG) 25 MG tablet  Take 1 tablet (25 mg total) by mouth 2 (two) times daily with meals.             furosemide (LASIX) 80 MG tablet  Take 1 tablet (80 mg total) by mouth 2 (two) times daily.             hydrALAZINE (APRESOLINE) 25 MG tablet  Take 1 tablet (25 mg total) by mouth every 8 (eight) hours.             isosorbide dinitrate (ISORDIL) 20 MG tablet  Take 1 tablet (20 mg total) by mouth 3 (three) times daily.             NIFEdipine (PROCARDIA-XL) 90 MG (OSM) 24 hr tablet  Take 1 tablet (90 mg total) by mouth once daily.             sevelamer carbonate (RENVELA) 800 mg Tab  Take 2 tablets (1,600 mg total) by mouth 2 (two) times daily with meals.             spironolactone (ALDACTONE) 25 MG tablet  Take 1 tablet (25 mg total) by mouth once daily.             tamsulosin (FLOMAX) 0.4 mg Cap  Take 1 capsule (0.4 mg total) by mouth once daily.                 Jarod Tee MD  Family Medicine  [facility]

## 2025-01-04 NOTE — PLAN OF CARE
Pt AAO x 4.  Pt remained afebrile throughout this shift.   Pt remained free of falls this shift.   PRN antihypertensives given for elevated BP readings.  Plan of care reviewed. Patient verbalizes understanding.   Pt moving/turing independently. Frequent weight shifting encouraged.  Patient SR on monitor.   Bed low, side rails up x 2, wheels locked, call light and urinal in reach.  Patient instructed to call for assistance.   Hourly rounding completed.   24 hour chart check completed.  Will continue to monitor.     Vaccine status unknown